# Patient Record
Sex: FEMALE | Race: WHITE | Employment: OTHER | URBAN - METROPOLITAN AREA
[De-identification: names, ages, dates, MRNs, and addresses within clinical notes are randomized per-mention and may not be internally consistent; named-entity substitution may affect disease eponyms.]

---

## 2018-04-06 ENCOUNTER — HOSPITAL ENCOUNTER (OUTPATIENT)
Age: 58
Discharge: HOME | End: 2018-04-08

## 2018-04-06 PROCEDURE — 88305 TISSUE EXAM BY PATHOLOGIST: CPT

## 2018-04-06 PROCEDURE — 88304 TISSUE EXAM BY PATHOLOGIST: CPT

## 2018-06-06 ENCOUNTER — HOSPITAL ENCOUNTER (OUTPATIENT)
Age: 58
Discharge: HOME | End: 2018-06-08

## 2018-06-06 PROCEDURE — 88305 TISSUE EXAM BY PATHOLOGIST: CPT

## 2018-12-07 LAB
CHOLESTEROL, TOTAL: 270 MG/DL
CHOLESTEROL/HDL RATIO: 6.4
HDLC SERPL-MCNC: 42 MG/DL (ref 35–70)
LDL CHOLESTEROL CALCULATED: 186 MG/DL (ref 0–160)
TRIGL SERPL-MCNC: 225 MG/DL
VLDLC SERPL CALC-MCNC: ABNORMAL MG/DL

## 2019-03-19 ENCOUNTER — APPOINTMENT (OUTPATIENT)
Dept: ULTRASOUND IMAGING | Age: 59
End: 2019-03-19
Payer: COMMERCIAL

## 2019-03-19 ENCOUNTER — HOSPITAL ENCOUNTER (EMERGENCY)
Age: 59
Discharge: HOME | End: 2019-03-19
Attending: EMERGENCY MEDICINE
Payer: COMMERCIAL

## 2019-03-19 VITALS
HEART RATE: 80 BPM | DIASTOLIC BLOOD PRESSURE: 68 MMHG | WEIGHT: 164 LBS | BODY MASS INDEX: 24.86 KG/M2 | HEIGHT: 68 IN | TEMPERATURE: 98.8 F | OXYGEN SATURATION: 99 % | RESPIRATION RATE: 16 BRPM | SYSTOLIC BLOOD PRESSURE: 134 MMHG

## 2019-03-19 DIAGNOSIS — M79.89 SWELLING OF LOWER EXTREMITY: Primary | ICD-10-CM

## 2019-03-19 PROCEDURE — 93971 EXTREMITY STUDY: CPT

## 2019-03-19 PROCEDURE — 96372 THER/PROPH/DIAG INJ SC/IM: CPT

## 2019-03-19 PROCEDURE — 6360000002 HC RX W HCPCS: Performed by: STUDENT IN AN ORGANIZED HEALTH CARE EDUCATION/TRAINING PROGRAM

## 2019-03-19 PROCEDURE — 99283 EMERGENCY DEPT VISIT LOW MDM: CPT

## 2019-03-19 PROCEDURE — 94761 N-INVAS EAR/PLS OXIMETRY MLT: CPT

## 2019-03-19 RX ORDER — KETOROLAC TROMETHAMINE 30 MG/ML
15 INJECTION, SOLUTION INTRAMUSCULAR; INTRAVENOUS ONCE
Status: COMPLETED | OUTPATIENT
Start: 2019-03-19 | End: 2019-03-19

## 2019-03-19 RX ADMIN — KETOROLAC TROMETHAMINE 15 MG: 30 INJECTION, SOLUTION INTRAMUSCULAR; INTRAVENOUS at 18:55

## 2019-03-19 SDOH — HEALTH STABILITY: MENTAL HEALTH: HOW OFTEN DO YOU HAVE A DRINK CONTAINING ALCOHOL?: NEVER

## 2019-03-19 ASSESSMENT — PAIN SCALES - GENERAL: PAINLEVEL_OUTOF10: 3

## 2019-03-20 ASSESSMENT — ENCOUNTER SYMPTOMS
COUGH: 0
EYE PAIN: 0
WHEEZING: 0
ABDOMINAL DISTENTION: 0
SINUS PRESSURE: 0
BACK PAIN: 0
NAUSEA: 0
ABDOMINAL PAIN: 0
EYE REDNESS: 0
CHEST TIGHTNESS: 0
TROUBLE SWALLOWING: 0
PHOTOPHOBIA: 0
CONSTIPATION: 0
BLOOD IN STOOL: 0
RHINORRHEA: 0
VOMITING: 0
DIARRHEA: 0
SORE THROAT: 0
SHORTNESS OF BREATH: 0

## 2019-03-21 ENCOUNTER — HOSPITAL ENCOUNTER (OUTPATIENT)
Dept: MRI IMAGING | Age: 59
Discharge: HOME | End: 2019-03-23
Payer: COMMERCIAL

## 2019-03-21 DIAGNOSIS — S94.31XA: ICD-10-CM

## 2019-03-21 DIAGNOSIS — M21.371 FOOT DROP, RIGHT FOOT: ICD-10-CM

## 2019-03-21 PROCEDURE — 73721 MRI JNT OF LWR EXTRE W/O DYE: CPT

## 2019-03-21 PROCEDURE — 72148 MRI LUMBAR SPINE W/O DYE: CPT

## 2019-04-30 ENCOUNTER — HOSPITAL ENCOUNTER (OUTPATIENT)
Age: 59
Discharge: HOME | End: 2019-05-02

## 2019-04-30 LAB
ABO/RH: NORMAL
ANION GAP SERPL CALCULATED.3IONS-SCNC: 10 MMOL/L (ref 7–16)
ANTIBODY SCREEN: NORMAL
APTT: 26.3 SEC (ref 24.5–35.1)
BASOPHILS ABSOLUTE: 0.04 E9/L (ref 0–0.2)
BASOPHILS RELATIVE PERCENT: 0.6 % (ref 0–2)
BUN BLDV-MCNC: 18 MG/DL (ref 6–20)
CALCIUM SERPL-MCNC: 9.5 MG/DL (ref 8.6–10.2)
CHLORIDE BLD-SCNC: 101 MMOL/L (ref 98–107)
CO2: 28 MMOL/L (ref 22–29)
CREAT SERPL-MCNC: 0.6 MG/DL (ref 0.5–1)
EOSINOPHILS ABSOLUTE: 0.09 E9/L (ref 0.05–0.5)
EOSINOPHILS RELATIVE PERCENT: 1.2 % (ref 0–6)
GFR AFRICAN AMERICAN: >60
GFR NON-AFRICAN AMERICAN: >60 ML/MIN/1.73
GLUCOSE BLD-MCNC: 100 MG/DL (ref 74–99)
HCT VFR BLD CALC: 36.4 % (ref 34–48)
HEMOGLOBIN: 11.6 G/DL (ref 11.5–15.5)
IMMATURE GRANULOCYTES #: 0.03 E9/L
IMMATURE GRANULOCYTES %: 0.4 % (ref 0–5)
INR BLD: 0.9
LYMPHOCYTES ABSOLUTE: 1.42 E9/L (ref 1.5–4)
LYMPHOCYTES RELATIVE PERCENT: 19.7 % (ref 20–42)
MCH RBC QN AUTO: 28.6 PG (ref 26–35)
MCHC RBC AUTO-ENTMCNC: 31.9 % (ref 32–34.5)
MCV RBC AUTO: 89.9 FL (ref 80–99.9)
MONOCYTES ABSOLUTE: 0.42 E9/L (ref 0.1–0.95)
MONOCYTES RELATIVE PERCENT: 5.8 % (ref 2–12)
NEUTROPHILS ABSOLUTE: 5.21 E9/L (ref 1.8–7.3)
NEUTROPHILS RELATIVE PERCENT: 72.3 % (ref 43–80)
PDW BLD-RTO: 13.6 FL (ref 11.5–15)
PLATELET # BLD: 227 E9/L (ref 130–450)
PMV BLD AUTO: 10.8 FL (ref 7–12)
POTASSIUM SERPL-SCNC: 4.6 MMOL/L (ref 3.5–5)
PROTHROMBIN TIME: 10 SEC (ref 9.3–12.4)
RBC # BLD: 4.05 E12/L (ref 3.5–5.5)
SODIUM BLD-SCNC: 139 MMOL/L (ref 132–146)
WBC # BLD: 7.2 E9/L (ref 4.5–11.5)

## 2019-04-30 PROCEDURE — 85025 COMPLETE CBC W/AUTO DIFF WBC: CPT

## 2019-04-30 PROCEDURE — 86850 RBC ANTIBODY SCREEN: CPT

## 2019-04-30 PROCEDURE — 80048 BASIC METABOLIC PNL TOTAL CA: CPT

## 2019-04-30 PROCEDURE — 85730 THROMBOPLASTIN TIME PARTIAL: CPT

## 2019-04-30 PROCEDURE — 87081 CULTURE SCREEN ONLY: CPT

## 2019-04-30 PROCEDURE — 86900 BLOOD TYPING SEROLOGIC ABO: CPT

## 2019-04-30 PROCEDURE — 86901 BLOOD TYPING SEROLOGIC RH(D): CPT

## 2019-04-30 PROCEDURE — 85610 PROTHROMBIN TIME: CPT

## 2019-05-02 LAB — MRSA CULTURE ONLY: NORMAL

## 2019-05-08 ENCOUNTER — HOSPITAL ENCOUNTER (OUTPATIENT)
Age: 59
Discharge: HOME | End: 2019-05-10

## 2019-05-08 LAB
ANION GAP SERPL CALCULATED.3IONS-SCNC: 10 MMOL/L (ref 7–16)
BUN BLDV-MCNC: 12 MG/DL (ref 6–20)
CALCIUM SERPL-MCNC: 9.2 MG/DL (ref 8.6–10.2)
CHLORIDE BLD-SCNC: 103 MMOL/L (ref 98–107)
CO2: 26 MMOL/L (ref 22–29)
CREAT SERPL-MCNC: 0.6 MG/DL (ref 0.5–1)
GFR AFRICAN AMERICAN: >60
GFR NON-AFRICAN AMERICAN: >60 ML/MIN/1.73
GLUCOSE BLD-MCNC: 104 MG/DL (ref 74–99)
HCT VFR BLD CALC: 33.1 % (ref 34–48)
HEMOGLOBIN: 10.5 G/DL (ref 11.5–15.5)
MCH RBC QN AUTO: 28.3 PG (ref 26–35)
MCHC RBC AUTO-ENTMCNC: 31.7 % (ref 32–34.5)
MCV RBC AUTO: 89.2 FL (ref 80–99.9)
PDW BLD-RTO: 13.7 FL (ref 11.5–15)
PLATELET # BLD: 198 E9/L (ref 130–450)
PMV BLD AUTO: 11.6 FL (ref 7–12)
POTASSIUM SERPL-SCNC: 4.1 MMOL/L (ref 3.5–5)
RBC # BLD: 3.71 E12/L (ref 3.5–5.5)
SODIUM BLD-SCNC: 139 MMOL/L (ref 132–146)
WBC # BLD: 13.6 E9/L (ref 4.5–11.5)

## 2019-05-08 PROCEDURE — 80048 BASIC METABOLIC PNL TOTAL CA: CPT

## 2019-05-08 PROCEDURE — 85027 COMPLETE CBC AUTOMATED: CPT

## 2019-05-09 ENCOUNTER — HOSPITAL ENCOUNTER (OUTPATIENT)
Age: 59
Discharge: HOME | End: 2019-05-11

## 2019-05-09 DIAGNOSIS — G47.00 INSOMNIA, UNSPECIFIED TYPE: Primary | ICD-10-CM

## 2019-05-09 LAB
ANION GAP SERPL CALCULATED.3IONS-SCNC: 10 MMOL/L (ref 7–16)
BUN BLDV-MCNC: 12 MG/DL (ref 6–20)
CALCIUM SERPL-MCNC: 8.4 MG/DL (ref 8.6–10.2)
CHLORIDE BLD-SCNC: 105 MMOL/L (ref 98–107)
CO2: 27 MMOL/L (ref 22–29)
CREAT SERPL-MCNC: 0.7 MG/DL (ref 0.5–1)
GFR AFRICAN AMERICAN: >60
GFR NON-AFRICAN AMERICAN: >60 ML/MIN/1.73
GLUCOSE BLD-MCNC: 89 MG/DL (ref 74–99)
HCT VFR BLD CALC: 31.8 % (ref 34–48)
HEMOGLOBIN: 9.8 G/DL (ref 11.5–15.5)
MCH RBC QN AUTO: 28.5 PG (ref 26–35)
MCHC RBC AUTO-ENTMCNC: 30.8 % (ref 32–34.5)
MCV RBC AUTO: 92.4 FL (ref 80–99.9)
PDW BLD-RTO: 14.1 FL (ref 11.5–15)
PLATELET # BLD: 166 E9/L (ref 130–450)
PMV BLD AUTO: 11.4 FL (ref 7–12)
POTASSIUM SERPL-SCNC: 4.1 MMOL/L (ref 3.5–5)
RBC # BLD: 3.44 E12/L (ref 3.5–5.5)
SODIUM BLD-SCNC: 142 MMOL/L (ref 132–146)
WBC # BLD: 9.1 E9/L (ref 4.5–11.5)

## 2019-05-09 PROCEDURE — 80048 BASIC METABOLIC PNL TOTAL CA: CPT

## 2019-05-09 PROCEDURE — 85027 COMPLETE CBC AUTOMATED: CPT

## 2019-05-09 RX ORDER — ZOLPIDEM TARTRATE 12.5 MG/1
12.5 TABLET, FILM COATED, EXTENDED RELEASE ORAL NIGHTLY PRN
Qty: 30 TABLET | Refills: 1 | Status: CANCELLED | OUTPATIENT
Start: 2019-05-09 | End: 2019-07-08

## 2019-05-09 RX ORDER — ZOLPIDEM TARTRATE 12.5 MG/1
12.5 TABLET, FILM COATED, EXTENDED RELEASE ORAL NIGHTLY PRN
Qty: 30 TABLET | Refills: 1 | OUTPATIENT
Start: 2019-05-09 | End: 2019-07-08

## 2019-05-10 ENCOUNTER — HOSPITAL ENCOUNTER (OUTPATIENT)
Age: 59
Discharge: HOME | End: 2019-05-12

## 2019-05-10 LAB
ANION GAP SERPL CALCULATED.3IONS-SCNC: 10 MMOL/L (ref 7–16)
BUN BLDV-MCNC: 12 MG/DL (ref 6–20)
CALCIUM SERPL-MCNC: 9.1 MG/DL (ref 8.6–10.2)
CHLORIDE BLD-SCNC: 99 MMOL/L (ref 98–107)
CO2: 29 MMOL/L (ref 22–29)
CREAT SERPL-MCNC: 0.7 MG/DL (ref 0.5–1)
GFR AFRICAN AMERICAN: >60
GFR NON-AFRICAN AMERICAN: >60 ML/MIN/1.73
GLUCOSE BLD-MCNC: 91 MG/DL (ref 74–99)
HCT VFR BLD CALC: 34.2 % (ref 34–48)
HEMOGLOBIN: 10.4 G/DL (ref 11.5–15.5)
MCH RBC QN AUTO: 27.7 PG (ref 26–35)
MCHC RBC AUTO-ENTMCNC: 30.4 % (ref 32–34.5)
MCV RBC AUTO: 91 FL (ref 80–99.9)
PDW BLD-RTO: 13.8 FL (ref 11.5–15)
PLATELET # BLD: 172 E9/L (ref 130–450)
PMV BLD AUTO: 11.5 FL (ref 7–12)
POTASSIUM SERPL-SCNC: 4.3 MMOL/L (ref 3.5–5)
RBC # BLD: 3.76 E12/L (ref 3.5–5.5)
SODIUM BLD-SCNC: 138 MMOL/L (ref 132–146)
WBC # BLD: 9.2 E9/L (ref 4.5–11.5)

## 2019-05-10 PROCEDURE — 80048 BASIC METABOLIC PNL TOTAL CA: CPT

## 2019-05-10 PROCEDURE — 85027 COMPLETE CBC AUTOMATED: CPT

## 2019-05-24 RX ORDER — OXYCODONE AND ACETAMINOPHEN 10; 325 MG/1; MG/1
1 TABLET ORAL EVERY 4 HOURS PRN
OUTPATIENT
Start: 2019-05-24

## 2019-06-04 ENCOUNTER — OFFICE VISIT (OUTPATIENT)
Dept: FAMILY MEDICINE CLINIC | Age: 59
End: 2019-06-04
Payer: COMMERCIAL

## 2019-06-04 VITALS
SYSTOLIC BLOOD PRESSURE: 130 MMHG | HEART RATE: 68 BPM | BODY MASS INDEX: 25.74 KG/M2 | DIASTOLIC BLOOD PRESSURE: 78 MMHG | TEMPERATURE: 97 F | WEIGHT: 164 LBS | HEIGHT: 67 IN | OXYGEN SATURATION: 97 %

## 2019-06-04 DIAGNOSIS — M62.830 SPASM OF LUMBAR PARASPINOUS MUSCLE: ICD-10-CM

## 2019-06-04 DIAGNOSIS — M48.062 LUMBAR STENOSIS WITH NEUROGENIC CLAUDICATION: Primary | ICD-10-CM

## 2019-06-04 DIAGNOSIS — H10.10 ALLERGIC CONJUNCTIVITIS AND RHINITIS, UNSPECIFIED LATERALITY: ICD-10-CM

## 2019-06-04 DIAGNOSIS — J30.9 ALLERGIC CONJUNCTIVITIS AND RHINITIS, UNSPECIFIED LATERALITY: ICD-10-CM

## 2019-06-04 DIAGNOSIS — M21.371 RIGHT FOOT DROP: ICD-10-CM

## 2019-06-04 PROCEDURE — 99214 OFFICE O/P EST MOD 30 MIN: CPT | Performed by: INTERNAL MEDICINE

## 2019-06-04 RX ORDER — CYCLOBENZAPRINE HCL 10 MG
10 TABLET ORAL NIGHTLY
Qty: 30 TABLET | Refills: 0 | Status: SHIPPED | OUTPATIENT
Start: 2019-06-04 | End: 2019-07-04

## 2019-06-04 RX ORDER — FLUTICASONE PROPIONATE 50 MCG
1 SPRAY, SUSPENSION (ML) NASAL DAILY
Qty: 1 BOTTLE | Refills: 2 | Status: SHIPPED | OUTPATIENT
Start: 2019-06-04

## 2019-06-04 RX ORDER — PREGABALIN 50 MG/1
50 CAPSULE ORAL 4 TIMES DAILY
Qty: 90 CAPSULE | Refills: 2 | Status: SHIPPED | OUTPATIENT
Start: 2019-06-04 | End: 2019-06-04 | Stop reason: SDUPTHER

## 2019-06-04 RX ORDER — PREGABALIN 50 MG/1
50 CAPSULE ORAL 4 TIMES DAILY
Qty: 120 CAPSULE | Refills: 2 | Status: SHIPPED | OUTPATIENT
Start: 2019-06-04 | End: 2019-08-15 | Stop reason: SDUPTHER

## 2019-06-04 RX ORDER — OXYCODONE AND ACETAMINOPHEN 10; 325 MG/1; MG/1
1 TABLET ORAL EVERY 6 HOURS PRN
Qty: 90 TABLET | Refills: 0 | Status: SHIPPED | OUTPATIENT
Start: 2019-06-18 | End: 2019-07-18

## 2019-06-04 NOTE — PROGRESS NOTES
2019     Radha Walter (:  1960) is a 66 y.o. female, here for evaluation of the following medical concerns:    OARRS: 19  Pain management agreement: 18  PHQ-9: 18  Urine drug screen: 18  Pain assessment questionnaire:  She does not use alcohol or tobacco. She is recently had lumbar discectomy performed by neurosurgery. She had an L3- 4- 5 fusion performed on May 7, 2019. Her pain at its worse is an 8/10    She actually did very well unexpectedly to the surgery. We did not expect much improvement in the right foot drop. However she is regaining what she thinks is 50% of her strength. Neurosurgery is very happy with this as are we today. She is having spasms in her back that does keep her awake at night. I'm noticing that she appears very tired and washed out today. A few episodes of sneezing has been causing some back spasms and pain. Review of Systems    Prior to Visit Medications    Medication Sig Taking? Authorizing Provider   cyclobenzaprine (FLEXERIL) 10 MG tablet Take 1 tablet by mouth nightly Yes Ildefonso Hernandez DO   oxyCODONE-acetaminophen (PERCOCET)  MG per tablet Take 1 tablet by mouth every 6 hours as needed for Pain for up to 30 days. Yes Ildefonso Hernandez DO   pregabalin (LYRICA) 50 MG capsule Take 1 capsule by mouth 4 times daily for 30 days. Yes Ildefonso Hernandez, DO   fluticasone [facility]) 50 MCG/ACT nasal spray 1 spray by Each Nostril route daily Yes Ildefonso Hernandez DO   zolpidem (AMBIEN CR) 12.5 MG extended release tablet Take 1 tablet by mouth nightly as needed for Sleep for up to 60 days.  Yes Ildefonso Hernandez DO   ranitidine (ZANTAC) 150 MG tablet Take 150 mg by mouth 2 times daily Yes Historical Provider, MD   tiZANidine (ZANAFLEX) 2 MG capsule Take 2 mg by mouth daily as needed for Muscle spasms Yes Historical Provider, MD   levothyroxine (SYNTHROID) 50 MCG tablet Take 50 mcg by mouth Daily Yes Historical Provider, MD

## 2019-06-07 ENCOUNTER — TELEPHONE (OUTPATIENT)
Dept: FAMILY MEDICINE CLINIC | Age: 59
End: 2019-06-07

## 2019-06-07 NOTE — TELEPHONE ENCOUNTER
With her heart she's not to use any other OTC decongestants. Typically Sudafed 10 mg dose(children's) is safe. Because of the recent surgery we cannot use steroids again.

## 2019-06-07 NOTE — TELEPHONE ENCOUNTER
Pt lmom she was seen tujose luis and you ordered a nasal spray for her sinus congestion. Now she has a headache and ear ache. She just had surgery , she is unable to drive here. Are you willing to recommend anything ?

## 2019-07-09 DIAGNOSIS — G47.00 INSOMNIA, UNSPECIFIED TYPE: Primary | ICD-10-CM

## 2019-07-09 RX ORDER — ZOLPIDEM TARTRATE 12.5 MG/1
1 TABLET, FILM COATED, EXTENDED RELEASE ORAL NIGHTLY
COMMUNITY
End: 2019-07-09 | Stop reason: SDUPTHER

## 2019-07-09 RX ORDER — ZOLPIDEM TARTRATE 12.5 MG/1
12.5 TABLET, FILM COATED, EXTENDED RELEASE ORAL NIGHTLY
Qty: 30 TABLET | Refills: 0 | Status: SHIPPED | OUTPATIENT
Start: 2019-07-09 | End: 2019-08-08

## 2019-07-09 NOTE — TELEPHONE ENCOUNTER
She is calling for a new script for Ambien. She has an appt on the 19th, but is going to run out of her pain med on Fri. She said that you usually give her 120, but last time you gave her 80.

## 2019-07-17 RX ORDER — NARATRIPTAN 1 MG/1
1 TABLET ORAL DAILY PRN
COMMUNITY
Start: 2018-08-16 | End: 2019-08-07 | Stop reason: SDUPTHER

## 2019-07-17 RX ORDER — ORPHENADRINE CITRATE 100 MG/1
100 TABLET, EXTENDED RELEASE ORAL 2 TIMES DAILY
COMMUNITY
End: 2019-09-06 | Stop reason: ALTCHOICE

## 2019-07-17 RX ORDER — PREGABALIN 50 MG/1
50 CAPSULE ORAL 4 TIMES DAILY
COMMUNITY
End: 2020-02-04

## 2019-07-17 RX ORDER — ROSUVASTATIN CALCIUM 40 MG/1
40 TABLET, COATED ORAL DAILY
COMMUNITY
Start: 2018-12-10 | End: 2019-11-12 | Stop reason: SDUPTHER

## 2019-07-17 RX ORDER — B-COMPLEX WITH VITAMIN C
TABLET ORAL 2 TIMES DAILY
COMMUNITY

## 2019-07-17 RX ORDER — MELOXICAM 15 MG/1
15 TABLET ORAL DAILY
COMMUNITY
Start: 2016-09-09 | End: 2019-11-12 | Stop reason: SDUPTHER

## 2019-07-17 RX ORDER — MOMETASONE FUROATE 50 UG/1
2 SPRAY, METERED NASAL DAILY
COMMUNITY
Start: 2018-07-20 | End: 2020-02-04

## 2019-07-19 ENCOUNTER — OFFICE VISIT (OUTPATIENT)
Dept: FAMILY MEDICINE CLINIC | Age: 59
End: 2019-07-19
Payer: COMMERCIAL

## 2019-07-19 VITALS
DIASTOLIC BLOOD PRESSURE: 80 MMHG | OXYGEN SATURATION: 98 % | SYSTOLIC BLOOD PRESSURE: 132 MMHG | BODY MASS INDEX: 26.98 KG/M2 | HEIGHT: 68 IN | HEART RATE: 86 BPM | WEIGHT: 178 LBS

## 2019-07-19 DIAGNOSIS — M25.552 LEFT HIP PAIN: ICD-10-CM

## 2019-07-19 DIAGNOSIS — M05.9 RHEUMATOID ARTHRITIS WITH POSITIVE RHEUMATOID FACTOR, INVOLVING UNSPECIFIED SITE (HCC): ICD-10-CM

## 2019-07-19 DIAGNOSIS — J30.9 ALLERGIC RHINITIS, UNSPECIFIED SEASONALITY, UNSPECIFIED TRIGGER: ICD-10-CM

## 2019-07-19 DIAGNOSIS — M48.062 LUMBAR STENOSIS WITH NEUROGENIC CLAUDICATION: Primary | ICD-10-CM

## 2019-07-19 PROCEDURE — 99213 OFFICE O/P EST LOW 20 MIN: CPT | Performed by: INTERNAL MEDICINE

## 2019-07-19 RX ORDER — OXYCODONE AND ACETAMINOPHEN 10; 325 MG/1; MG/1
1 TABLET ORAL EVERY 6 HOURS
COMMUNITY
End: 2019-07-19 | Stop reason: SDUPTHER

## 2019-07-19 RX ORDER — OXYCODONE AND ACETAMINOPHEN 10; 325 MG/1; MG/1
1 TABLET ORAL EVERY 6 HOURS
Qty: 120 TABLET | Refills: 0 | Status: SHIPPED | OUTPATIENT
Start: 2019-07-19 | End: 2019-08-15 | Stop reason: SDUPTHER

## 2019-07-19 RX ORDER — CYCLOBENZAPRINE HCL 10 MG
10 TABLET ORAL 3 TIMES DAILY PRN
COMMUNITY
End: 2019-07-19 | Stop reason: SDUPTHER

## 2019-07-19 RX ORDER — CYCLOBENZAPRINE HCL 10 MG
10 TABLET ORAL 3 TIMES DAILY PRN
Qty: 90 TABLET | Refills: 3 | Status: SHIPPED | OUTPATIENT
Start: 2019-07-19 | End: 2019-08-18

## 2019-07-19 NOTE — PROGRESS NOTES
Provider, MD   mometasone (NASONEX) 50 MCG/ACT nasal spray 2 sprays by Each Nostril route daily Yes Historical Provider, MD   meloxicam (MOBIC) 15 MG tablet Take 15 mg by mouth daily Yes Historical Provider, MD   aspirin 81 MG tablet Take 81 mg by mouth daily Yes Historical Provider, MD   B Complex Vitamins (VITAMIN B COMPLEX) TABS Take by mouth 2 times daily Indications: 1 po bif (low B12 on 5/2018 labs) Yes Historical Provider, MD   orphenadrine (NORFLEX) 100 MG extended release tablet Take 100 mg by mouth 2 times daily Yes Historical Provider, MD   pregabalin (LYRICA) 50 MG capsule Take 50 mg by mouth 4 times daily. Yes Historical Provider, MD   zolpidem (AMBIEN CR) 12.5 MG extended release tablet Take 1 tablet by mouth nightly for 30 days. Yes Ramandeep Marks DO   fluticasone [facility]) 50 MCG/ACT nasal spray 1 spray by Each Nostril route daily Yes Ramandeep Marks DO   ranitidine (ZANTAC) 150 MG tablet Take 150 mg by mouth 2 times daily Yes Historical Provider, MD   tiZANidine (ZANAFLEX) 2 MG capsule Take 2 mg by mouth daily as needed for Muscle spasms Yes Historical Provider, MD   levothyroxine (SYNTHROID) 50 MCG tablet Take 50 mcg by mouth Daily Yes Historical Provider, MD   nabumetone (RELAFEN) 750 MG tablet Take 750 mg by mouth 2 times daily Yes Historical Provider, MD   Cholecalciferol (VITAMIN D) 2000 UNITS CAPS capsule Take 2,000 Units by mouth daily Yes Historical Provider, MD   SUMAtriptan (IMITREX) 50 MG tablet Take 50 mg by mouth once as needed for Migraine Yes Historical Provider, MD   Diltiazem HCl Coated Beads (DILTIAZEM CD PO) Take by mouth daily Yes Historical Provider, MD   pregabalin (LYRICA) 50 MG capsule Take 1 capsule by mouth 4 times daily for 30 days.   Ramandeep Marks DO        Social History     Tobacco Use    Smoking status: Never Smoker    Smokeless tobacco: Never Used   Substance Use Topics    Alcohol use: Never     Frequency: Never        Vitals:    07/19/19 1315 BP: 132/80   Pulse: 86   SpO2: 98%   Weight: 178 lb (80.7 kg)   Height: 5' 8\" (1.727 m)     Estimated body mass index is 27.06 kg/m² as calculated from the following:    Height as of this encounter: 5' 8\" (1.727 m). Weight as of this encounter: 178 lb (80.7 kg). Physical Exam   Heart is regular rate and rhythm without gallops or clicks in the lungs are clear to auscultation without wheezes rales or rhonchi. Sinus mucosa shows pallor and edema with clear and clear drainage. No evidence of any injection. There is no palpable maxillary edema noted. Posterior pharynx was clear drainage. TMs are bilaterally intact without serous otitis. Eyes are PERRL and EOMI sclerae white conjunctivae clear. She ambulates really without any difficulty today. Skin warm and dry. She is able to go from a seated to standing position and does not require any assistance and can do it under 30 seconds without any significant difficulty. DTRs are intact of the lower extremities and are not hyperreflexic. ASSESSMENT/PLAN:    ICD-10-CM    1. Lumbar stenosis with neurogenic claudication M48.062 oxyCODONE-acetaminophen (PERCOCET)  MG per tablet        No follow-ups on file. Patient requests narcotic medication refill. I have reviewed the current medications and prior notes regarding the need for these refills  I believe the need for refill is warranted at this time. I have reviewed the OARRS report and have found no suspicion of drug seeking behavior. I have discussed the side effects and narcotic policy with this patient ad the patient has demonstrated understanding. Following the guidelines of the CDC, [place] and state medical board of [place], a refill of the narcotic was given today  A follow up appointment will be scheduled with me. Have refilled her controlled substance Lyrica also. She is having spasms that keeps her awake at night. I've given her Flexeril 10 mg to use just at night.       --David Shukla, DO on 7/19/2019 at 1:44 PM

## 2019-07-23 ENCOUNTER — TELEPHONE (OUTPATIENT)
Dept: FAMILY MEDICINE CLINIC | Age: 59
End: 2019-07-23

## 2019-07-23 RX ORDER — AMOXICILLIN 875 MG/1
875 TABLET, COATED ORAL 2 TIMES DAILY
Qty: 20 TABLET | Refills: 0 | Status: SHIPPED | OUTPATIENT
Start: 2019-07-23 | End: 2019-08-02

## 2019-07-23 NOTE — TELEPHONE ENCOUNTER
Patient called in stating that she is feeling worse than she did on Friday. States she thinks its a sinus infection.

## 2019-08-07 RX ORDER — NARATRIPTAN 1 MG/1
1 TABLET ORAL ONCE
Qty: 90 TABLET | Refills: 1 | Status: SHIPPED | OUTPATIENT
Start: 2019-08-07 | End: 2019-08-15 | Stop reason: SDUPTHER

## 2019-08-09 DIAGNOSIS — G47.00 INSOMNIA, UNSPECIFIED TYPE: Primary | ICD-10-CM

## 2019-08-09 RX ORDER — ZOLPIDEM TARTRATE 12.5 MG/1
12.5 TABLET, FILM COATED, EXTENDED RELEASE ORAL NIGHTLY PRN
Qty: 30 TABLET | Refills: 0 | Status: SHIPPED | OUTPATIENT
Start: 2019-08-09 | End: 2019-08-15 | Stop reason: SDUPTHER

## 2019-08-09 RX ORDER — ZOLPIDEM TARTRATE 12.5 MG/1
12.5 TABLET, FILM COATED, EXTENDED RELEASE ORAL NIGHTLY PRN
COMMUNITY
End: 2019-08-09 | Stop reason: SDUPTHER

## 2019-08-15 ENCOUNTER — OFFICE VISIT (OUTPATIENT)
Dept: FAMILY MEDICINE CLINIC | Age: 59
End: 2019-08-15
Payer: COMMERCIAL

## 2019-08-15 VITALS
SYSTOLIC BLOOD PRESSURE: 134 MMHG | DIASTOLIC BLOOD PRESSURE: 74 MMHG | HEIGHT: 68 IN | HEART RATE: 87 BPM | OXYGEN SATURATION: 97 % | BODY MASS INDEX: 27.06 KG/M2 | TEMPERATURE: 97 F

## 2019-08-15 DIAGNOSIS — M48.062 LUMBAR STENOSIS WITH NEUROGENIC CLAUDICATION: ICD-10-CM

## 2019-08-15 DIAGNOSIS — G47.00 INSOMNIA, UNSPECIFIED TYPE: ICD-10-CM

## 2019-08-15 DIAGNOSIS — M79.672 FOOT PAIN, LEFT: Primary | ICD-10-CM

## 2019-08-15 PROBLEM — S92.423A: Status: ACTIVE | Noted: 2019-08-15

## 2019-08-15 PROCEDURE — 99214 OFFICE O/P EST MOD 30 MIN: CPT | Performed by: INTERNAL MEDICINE

## 2019-08-15 RX ORDER — OXYCODONE AND ACETAMINOPHEN 10; 325 MG/1; MG/1
1 TABLET ORAL EVERY 6 HOURS
Qty: 120 TABLET | Refills: 0 | Status: SHIPPED | OUTPATIENT
Start: 2019-08-15 | End: 2019-09-10 | Stop reason: SDUPTHER

## 2019-08-15 RX ORDER — NEOMYCIN SULFATE, POLYMYXIN B SULFATE, AND DEXAMETHASONE 3.5; 10000; 1 MG/G; [USP'U]/G; MG/G
OINTMENT OPHTHALMIC 3 TIMES DAILY
Qty: 3.5 G | Refills: 2 | Status: SHIPPED | OUTPATIENT
Start: 2019-08-15 | End: 2020-02-04

## 2019-08-15 RX ORDER — ZOLPIDEM TARTRATE 12.5 MG/1
12.5 TABLET, FILM COATED, EXTENDED RELEASE ORAL NIGHTLY PRN
Qty: 30 TABLET | Refills: 0 | Status: SHIPPED | OUTPATIENT
Start: 2019-08-15 | End: 2019-09-14

## 2019-08-15 RX ORDER — NARATRIPTAN 1 MG/1
1 TABLET ORAL ONCE
Qty: 9 TABLET | Refills: 2 | Status: SHIPPED | OUTPATIENT
Start: 2019-08-15 | End: 2019-12-09

## 2019-08-15 RX ORDER — PREGABALIN 50 MG/1
50 CAPSULE ORAL 4 TIMES DAILY
Qty: 120 CAPSULE | Refills: 2 | Status: SHIPPED | OUTPATIENT
Start: 2019-08-15 | End: 2019-09-06 | Stop reason: SDUPTHER

## 2019-08-15 NOTE — PROGRESS NOTES
8/15/2019     Kerri Antunez (:  1960) is a 66 y.o. female, here for evaluation of the following medical concerns:  She is feeling a grinding sensation again in the left hip. She is already been to see orthopedics once about the knee. I told her she should follow-up with orthopedics again as this is already had a hip arthroplasty performed. He is also having some green discharge from her sinuses in the last 2 days. Headaches and congestion for the last week. No fevers or chills. OARRS: 8-15-19  Pain management agreement: 18  PHQ-9: 18  Urine drug screen: 18  Pain assessment questionnaire:  She does not use alcohol or tobacco. She is recently had lumbar discectomy performed by neurosurgery. She had an L3- 4- 5 fusion performed on May 7, 2019. Her pain at its worse is an 8/10    She also had tripped pinning her left great toe behind her foot 2 weeks ago. The swelling has gone down but is still rather uncomfortable for her. She is requesting a refill of her ophthalmologic ointment that she uses for any kind of styes that develop. Review of Systems    Prior to Visit Medications    Medication Sig Taking? Authorizing Provider   oxyCODONE-acetaminophen (PERCOCET)  MG per tablet Take 1 tablet by mouth every 6 hours for 30 days. Yes Ping Blanca, DO   zolpidem (AMBIEN CR) 12.5 MG extended release tablet Take 1 tablet by mouth nightly as needed for Sleep for up to 30 days. Yes Ping Blanca, DO   naratriptan HCl (AMERGE) 1 MG tablet Take 1 tablet by mouth once for 1 dose 1 po q 12 prn migraine Yes Ping Blanca, DO   pregabalin (LYRICA) 50 MG capsule Take 1 capsule by mouth 4 times daily for 30 days.  Yes Ping Blanca, DO   neomycin-polymyxin-dexameth 3.5-89757-8.1 OINT Place into both eyes 3 times daily Yes Ping Blanca, DO   cyclobenzaprine (FLEXERIL) 10 MG tablet Take 1 tablet by mouth 3 times daily as needed for Muscle spasms  Madeleine Gonzalez m)     Estimated body mass index is 27.06 kg/m² as calculated from the following:    Height as of this encounter: 5' 8\" (1.727 m). Weight as of 7/19/19: 178 lb (80.7 kg). Physical Exam   Heart is regular rate and rhythm without gallops or clicks in the lungs are clear to auscultation without wheezes rales or rhonchi. Sinus mucosa shows pallor and edema with clear and clear drainage. No evidence of any injection. There is no palpable maxillary edema noted. Posterior pharynx was clear drainage. TMs are bilaterally intact without serous otitis. Eyes are PERRL and EOMI sclerae white conjunctivae clear. She ambulates really without any difficulty today. Skin warm and dry. She is able to go from a seated to standing position and does not require any assistance and can do it under 30 seconds without any significant difficulty. Exam of the left great toe does show to begin tenderness. Unable to manipulate this at all. An x-ray was performed today and was reviewed with the patient. This shows a slightly displaced non-union fracture possibly spiral fracture of the left great toe. ASSESSMENT/PLAN:    ICD-10-CM    1. Lumbar stenosis with neurogenic claudication M48.062 oxyCODONE-acetaminophen (PERCOCET)  MG per tablet     pregabalin (LYRICA) 50 MG capsule   2. Insomnia, unspecified type G47.00 zolpidem (AMBIEN CR) 12.5 MG extended release tablet        No follow-ups on file. Patient requests narcotic medication refill. I have reviewed the current medications and prior notes regarding the need for these refills  I believe the need for refill is warranted at this time. I have reviewed the OARRS report and have found no suspicion of drug seeking behavior. I have discussed the side effects and narcotic policy with this patient ad the patient has demonstrated understanding.   Following the guidelines of the CDC, [place] and state medical board of [place], a refill of the narcotic was given today  A follow up

## 2019-08-16 ENCOUNTER — OFFICE VISIT (OUTPATIENT)
Dept: FAMILY MEDICINE CLINIC | Age: 59
End: 2019-08-16
Payer: COMMERCIAL

## 2019-08-16 ENCOUNTER — OFFICE VISIT (OUTPATIENT)
Dept: PODIATRY | Age: 59
End: 2019-08-16
Payer: COMMERCIAL

## 2019-08-16 VITALS
SYSTOLIC BLOOD PRESSURE: 143 MMHG | HEIGHT: 68 IN | DIASTOLIC BLOOD PRESSURE: 91 MMHG | BODY MASS INDEX: 27.06 KG/M2 | TEMPERATURE: 97.7 F

## 2019-08-16 DIAGNOSIS — M79.675 PAIN IN LEFT TOE(S): ICD-10-CM

## 2019-08-16 DIAGNOSIS — M05.9 RHEUMATOID ARTHRITIS WITH POSITIVE RHEUMATOID FACTOR, INVOLVING UNSPECIFIED SITE (HCC): ICD-10-CM

## 2019-08-16 DIAGNOSIS — Z01.818 PRE-OP EXAM: Primary | ICD-10-CM

## 2019-08-16 DIAGNOSIS — S92.422A CLOSED DISPLACED FRACTURE OF DISTAL PHALANX OF LEFT GREAT TOE, INITIAL ENCOUNTER: Primary | ICD-10-CM

## 2019-08-16 PROCEDURE — 99213 OFFICE O/P EST LOW 20 MIN: CPT | Performed by: PODIATRIST

## 2019-08-16 PROCEDURE — 93000 ELECTROCARDIOGRAM COMPLETE: CPT | Performed by: INTERNAL MEDICINE

## 2019-08-16 PROCEDURE — 99212 OFFICE O/P EST SF 10 MIN: CPT | Performed by: INTERNAL MEDICINE

## 2019-08-16 NOTE — PROGRESS NOTES
(NORFLEX) 100 MG extended release tablet Take 100 mg by mouth 2 times daily  Historical Provider, MD   pregabalin (LYRICA) 50 MG capsule Take 50 mg by mouth 4 times daily. Historical Provider, MD   fluticasone (FLONASE) 50 MCG/ACT nasal spray 1 spray by Each Nostril route daily  Manasa Jensen DO   ranitidine (ZANTAC) 150 MG tablet Take 150 mg by mouth 2 times daily  Historical Provider, MD   tiZANidine (ZANAFLEX) 2 MG capsule Take 2 mg by mouth daily as needed for Muscle spasms  Historical Provider, MD   levothyroxine (SYNTHROID) 50 MCG tablet Take 50 mcg by mouth Daily  Historical Provider, MD   nabumetone (RELAFEN) 750 MG tablet Take 750 mg by mouth 2 times daily  Historical Provider, MD   Cholecalciferol (VITAMIN D) 2000 UNITS CAPS capsule Take 2,000 Units by mouth daily  Historical Provider, MD   SUMAtriptan (IMITREX) 50 MG tablet Take 50 mg by mouth once as needed for Migraine  Historical Provider, MD   Diltiazem HCl Coated Beads (DILTIAZEM CD PO) Take by mouth daily  Historical Provider, MD      Allergies   Allergen Reactions    Sulfa Antibiotics Rash       Past Medical History:   Diagnosis Date    Anemia     Arthritis, rheumatoid (HCC)     Fibromyalgia     GERD (gastroesophageal reflux disease)     Hypercholesterolemia     Hypothyroidism     Irritable bowel syndrome     Lump of right breast     Mononucleosis 03/2009    Seasonal allergies      Past Surgical History:   Procedure Laterality Date    APPENDECTOMY      CHOLECYSTECTOMY      COLON SURGERY      HYSTERECTOMY      JOINT REPLACEMENT      TONSILLECTOMY      TOTAL HIP ARTHROPLASTY      left      Social History     Tobacco Use    Smoking status: Never Smoker    Smokeless tobacco: Never Used   Substance Use Topics    Alcohol use: Never     Frequency: Never        There were no vitals filed for this visit.   Estimated body mass index is 27.06 kg/m² as calculated from the following:    Height as of an earlier encounter on 8/16/19: 5' 8\" (1.727 m). Weight as of 7/19/19: 178 lb (80.7 kg). Physical Exam   She is alert and oriented x3. Ambulates well with the walking boot and does not have any difficulty. EKG: Normal sinus rhythm    ASSESSMENT/PLAN:    ICD-10-CM    1. Pre-op exam Z01.818 EKG 12 Lead     EKG 12 Lead      She will have laboratory performed a week before. She already has an appointment with me for management of her narcotic pain medication. This visit will be 3 days prior to the surgery and final approval can be given at that time. No follow-ups on file. An electronic signature was used to authenticate this note.     --Lisa Hernandez, DO on 8/16/2019 at 2:18 PM

## 2019-08-16 NOTE — PROGRESS NOTES
19  Karma Gottron : 1960 Sex: female  Age: 66 y.o. Patient was referred by Justin Hurtado DO    Chief Complaint   Patient presents with    Foot Injury     pt fell down the steps a few weeks ago she has been walking on her left foot saw Dr. Carolyne Leung yesterday had xrays showing a fx that might need sx next Friday in cam walker        HPI: Patient is seen with her  regarding trauma to the left great toe patient approximately 10 to 12 days ago fell patient was seen yesterday by her PCP took an x-ray there was a displaced proximal phalanx first digit fracture patient has a long history of some injuries we did a first metatarsal phalangeal fusion on the right she is also had lower back surgery due to a dropfoot    ROS:  Const: Denies constitutional symptoms  Musculo: Denies symptoms other than stated above  Skin: Denies symptoms other than stated above       Current Outpatient Medications:     oxyCODONE-acetaminophen (PERCOCET)  MG per tablet, Take 1 tablet by mouth every 6 hours for 30 days. , Disp: 120 tablet, Rfl: 0    zolpidem (AMBIEN CR) 12.5 MG extended release tablet, Take 1 tablet by mouth nightly as needed for Sleep for up to 30 days. , Disp: 30 tablet, Rfl: 0    pregabalin (LYRICA) 50 MG capsule, Take 1 capsule by mouth 4 times daily for 30 days. , Disp: 120 capsule, Rfl: 2    neomycin-polymyxin-dexameth 3.5-31665-9.1 OINT, Place into both eyes 3 times daily, Disp: 3.5 g, Rfl: 2    cyclobenzaprine (FLEXERIL) 10 MG tablet, Take 1 tablet by mouth 3 times daily as needed for Muscle spasms, Disp: 90 tablet, Rfl: 3    esomeprazole (NEXIUM) 20 MG delayed release capsule, Take 20 mg by mouth nightly, Disp: , Rfl:     rosuvastatin (CRESTOR) 40 MG tablet, Take 40 mg by mouth daily, Disp: , Rfl:     mometasone (NASONEX) 50 MCG/ACT nasal spray, 2 sprays by Each Nostril route daily, Disp: , Rfl:     meloxicam (MOBIC) 15 MG tablet, Take 15 mg by mouth daily, Disp: , Rfl:     aspirin 83 Mother     Cancer Mother 68        colon    High Blood Pressure Father     Heart Disease Father     High Blood Pressure Brother     Diabetes Brother     Cancer Maternal Cousin         breast cancer    Cancer Paternal Cousin         breast cancer     Social History     Socioeconomic History    Marital status:      Spouse name: Not on file    Number of children: Not on file    Years of education: Not on file    Highest education level: Not on file   Occupational History    Not on file   Social Needs    Financial resource strain: Not on file    Food insecurity:     Worry: Not on file     Inability: Not on file    Transportation needs:     Medical: Not on file     Non-medical: Not on file   Tobacco Use    Smoking status: Never Smoker    Smokeless tobacco: Never Used   Substance and Sexual Activity    Alcohol use: Never     Frequency: Never    Drug use: Never    Sexual activity: Not on file   Lifestyle    Physical activity:     Days per week: Not on file     Minutes per session: Not on file    Stress: Not on file   Relationships    Social connections:     Talks on phone: Not on file     Gets together: Not on file     Attends Islam service: Not on file     Active member of club or organization: Not on file     Attends meetings of clubs or organizations: Not on file     Relationship status: Not on file    Intimate partner violence:     Fear of current or ex partner: Not on file     Emotionally abused: Not on file     Physically abused: Not on file     Forced sexual activity: Not on file   Other Topics Concern    Not on file   Social History Narrative    Not on file       Vitals:    08/16/19 1257   BP: (!) 143/91   Temp: 97.7 °F (36.5 °C)   TempSrc: Temporal   Weight: Comment: cam walker   Height: 5' 8\" (1.727 m)       Focused Lower Extremity Physical Exam:  Vitals:    08/16/19 1257   BP: (!) 143/91   Temp: 97.7 °F (36.5 °C)        Foot Exam    General  General Appearance: appears stated

## 2019-08-23 ENCOUNTER — TELEPHONE (OUTPATIENT)
Dept: PODIATRY | Age: 59
End: 2019-08-23

## 2019-08-26 ENCOUNTER — PREP FOR PROCEDURE (OUTPATIENT)
Dept: PODIATRY | Age: 59
End: 2019-08-26

## 2019-08-26 RX ORDER — SODIUM CHLORIDE 0.9 % (FLUSH) 0.9 %
10 SYRINGE (ML) INJECTION PRN
Status: CANCELLED | OUTPATIENT
Start: 2019-08-26

## 2019-08-26 RX ORDER — SODIUM CHLORIDE 0.9 % (FLUSH) 0.9 %
10 SYRINGE (ML) INJECTION EVERY 12 HOURS SCHEDULED
Status: CANCELLED | OUTPATIENT
Start: 2019-08-26

## 2019-09-06 ENCOUNTER — HOSPITAL ENCOUNTER (OUTPATIENT)
Age: 59
Discharge: HOME | End: 2019-09-08
Payer: COMMERCIAL

## 2019-09-06 DIAGNOSIS — M05.9 RHEUMATOID ARTHRITIS WITH POSITIVE RHEUMATOID FACTOR, INVOLVING UNSPECIFIED SITE (HCC): ICD-10-CM

## 2019-09-06 DIAGNOSIS — Z01.818 PRE-OP EXAM: ICD-10-CM

## 2019-09-06 LAB
ALBUMIN SERPL-MCNC: 4.5 G/DL (ref 3.5–5.2)
ALP BLD-CCNC: 87 U/L (ref 35–104)
ALT SERPL-CCNC: 8 U/L (ref 0–32)
ANION GAP SERPL CALCULATED.3IONS-SCNC: 13 MMOL/L (ref 7–16)
AST SERPL-CCNC: 15 U/L (ref 0–31)
BASOPHILS ABSOLUTE: 0.06 E9/L (ref 0–0.2)
BASOPHILS RELATIVE PERCENT: 1 % (ref 0–2)
BILIRUB SERPL-MCNC: 0.2 MG/DL (ref 0–1.2)
BILIRUBIN DIRECT: <0.2 MG/DL (ref 0–0.3)
BILIRUBIN, INDIRECT: NORMAL MG/DL (ref 0–1)
BUN BLDV-MCNC: 9 MG/DL (ref 6–20)
CALCIUM SERPL-MCNC: 9.4 MG/DL (ref 8.6–10.2)
CHLORIDE BLD-SCNC: 105 MMOL/L (ref 98–107)
CO2: 27 MMOL/L (ref 22–29)
CREAT SERPL-MCNC: 0.7 MG/DL (ref 0.5–1)
EOSINOPHILS ABSOLUTE: 0.17 E9/L (ref 0.05–0.5)
EOSINOPHILS RELATIVE PERCENT: 2.8 % (ref 0–6)
FOLATE: 8.6 NG/ML (ref 4.8–24.2)
GFR AFRICAN AMERICAN: >60
GFR NON-AFRICAN AMERICAN: >60 ML/MIN/1.73
GLUCOSE BLD-MCNC: 96 MG/DL (ref 74–99)
HCT VFR BLD CALC: 35.7 % (ref 34–48)
HEMOGLOBIN: 11.4 G/DL (ref 11.5–15.5)
IMMATURE GRANULOCYTES #: 0.03 E9/L
IMMATURE GRANULOCYTES %: 0.5 % (ref 0–5)
LYMPHOCYTES ABSOLUTE: 2.2 E9/L (ref 1.5–4)
LYMPHOCYTES RELATIVE PERCENT: 36.1 % (ref 20–42)
MCH RBC QN AUTO: 26.6 PG (ref 26–35)
MCHC RBC AUTO-ENTMCNC: 31.9 % (ref 32–34.5)
MCV RBC AUTO: 83.4 FL (ref 80–99.9)
MONOCYTES ABSOLUTE: 0.43 E9/L (ref 0.1–0.95)
MONOCYTES RELATIVE PERCENT: 7.1 % (ref 2–12)
NEUTROPHILS ABSOLUTE: 3.2 E9/L (ref 1.8–7.3)
NEUTROPHILS RELATIVE PERCENT: 52.5 % (ref 43–80)
PDW BLD-RTO: 14 FL (ref 11.5–15)
PHOSPHORUS: 3.6 MG/DL (ref 2.5–4.5)
PLATELET # BLD: 232 E9/L (ref 130–450)
PMV BLD AUTO: 11.4 FL (ref 7–12)
POTASSIUM SERPL-SCNC: 4.2 MMOL/L (ref 3.5–5)
RBC # BLD: 4.28 E12/L (ref 3.5–5.5)
SODIUM BLD-SCNC: 145 MMOL/L (ref 132–146)
TOTAL PROTEIN: 7.6 G/DL (ref 6.4–8.3)
VITAMIN B-12: 661 PG/ML (ref 211–946)
WBC # BLD: 6.1 E9/L (ref 4.5–11.5)

## 2019-09-06 PROCEDURE — 84155 ASSAY OF PROTEIN SERUM: CPT

## 2019-09-06 PROCEDURE — 82746 ASSAY OF FOLIC ACID SERUM: CPT

## 2019-09-06 PROCEDURE — 82247 BILIRUBIN TOTAL: CPT

## 2019-09-06 PROCEDURE — 84075 ASSAY ALKALINE PHOSPHATASE: CPT

## 2019-09-06 PROCEDURE — 80069 RENAL FUNCTION PANEL: CPT

## 2019-09-06 PROCEDURE — 85025 COMPLETE CBC W/AUTO DIFF WBC: CPT

## 2019-09-06 PROCEDURE — 84460 ALANINE AMINO (ALT) (SGPT): CPT

## 2019-09-06 PROCEDURE — 82607 VITAMIN B-12: CPT

## 2019-09-06 PROCEDURE — 86703 HIV-1/HIV-2 1 RESULT ANTBDY: CPT

## 2019-09-06 PROCEDURE — 86803 HEPATITIS C AB TEST: CPT

## 2019-09-06 PROCEDURE — 82248 BILIRUBIN DIRECT: CPT

## 2019-09-06 PROCEDURE — 36415 COLL VENOUS BLD VENIPUNCTURE: CPT

## 2019-09-06 PROCEDURE — 84450 TRANSFERASE (AST) (SGOT): CPT

## 2019-09-06 RX ORDER — NARATRIPTAN 1 MG/1
1 TABLET ORAL PRN
COMMUNITY
End: 2019-11-12 | Stop reason: SDUPTHER

## 2019-09-06 RX ORDER — CYCLOBENZAPRINE HCL 10 MG
10 TABLET ORAL 2 TIMES DAILY PRN
COMMUNITY
End: 2019-12-02 | Stop reason: SDUPTHER

## 2019-09-09 LAB
HEPATITIS C ANTIBODY INTERPRETATION: NORMAL
HIV-1 AND HIV-2 ANTIBODIES: NORMAL

## 2019-09-10 ENCOUNTER — OFFICE VISIT (OUTPATIENT)
Dept: FAMILY MEDICINE CLINIC | Age: 59
End: 2019-09-10
Payer: COMMERCIAL

## 2019-09-10 VITALS
DIASTOLIC BLOOD PRESSURE: 68 MMHG | OXYGEN SATURATION: 98 % | HEART RATE: 88 BPM | TEMPERATURE: 97 F | SYSTOLIC BLOOD PRESSURE: 124 MMHG

## 2019-09-10 DIAGNOSIS — M48.062 LUMBAR STENOSIS WITH NEUROGENIC CLAUDICATION: Primary | ICD-10-CM

## 2019-09-10 PROCEDURE — 99213 OFFICE O/P EST LOW 20 MIN: CPT | Performed by: INTERNAL MEDICINE

## 2019-09-10 RX ORDER — OXYCODONE AND ACETAMINOPHEN 10; 325 MG/1; MG/1
1 TABLET ORAL EVERY 6 HOURS
Qty: 120 TABLET | Refills: 0 | Status: SHIPPED | OUTPATIENT
Start: 2019-09-10 | End: 2019-10-15 | Stop reason: SDUPTHER

## 2019-09-10 RX ORDER — ORPHENADRINE CITRATE 100 MG/1
100 TABLET, EXTENDED RELEASE ORAL 2 TIMES DAILY
Qty: 60 TABLET | Refills: 2 | Status: ON HOLD | OUTPATIENT
Start: 2019-09-10 | End: 2019-09-13 | Stop reason: ALTCHOICE

## 2019-09-10 NOTE — PROGRESS NOTES
rosuvastatin (CRESTOR) 40 MG tablet Take 40 mg by mouth daily  Historical Provider, MD   mometasone (NASONEX) 50 MCG/ACT nasal spray 2 sprays by Each Nostril route daily  Historical Provider, MD   meloxicam (MOBIC) 15 MG tablet Take 15 mg by mouth daily  Historical Provider, MD   aspirin 81 MG tablet Take 81 mg by mouth daily Pt instructed to check hold with dr. Cira Jones Provider, MD   B Complex Vitamins (VITAMIN B COMPLEX) TABS Take by mouth 2 times daily Indications: 1 po bif (low B12 on 5/2018 labs) Ld 9/7/2019  Historical Provider, MD   pregabalin (LYRICA) 50 MG capsule Take 50 mg by mouth 4 times daily. Instructed to take am of procedure  Historical Provider, MD   fluticasone (FLONASE) 50 MCG/ACT nasal spray 1 spray by Each Nostril route daily  Rexann Holstein,    levothyroxine (SYNTHROID) 50 MCG tablet Take 50 mcg by mouth Daily  Historical Provider, MD   nabumetone (RELAFEN) 750 MG tablet Take 750 mg by mouth 2 times daily  Historical Provider, MD   Cholecalciferol (VITAMIN D) 2000 UNITS CAPS capsule Take 2,000 Units by mouth daily  Historical Provider, MD        Social History     Tobacco Use    Smoking status: Never Smoker    Smokeless tobacco: Never Used   Substance Use Topics    Alcohol use: Never     Frequency: Never        Vitals:    09/10/19 1435   BP: 124/68   Pulse: 88   Temp: 97 °F (36.1 °C)   SpO2: 98%     Estimated body mass index is 26.3 kg/m² as calculated from the following:    Height as of 9/6/19: 5' 8\" (1.727 m). Weight as of 9/6/19: 173 lb (78.5 kg). Physical Exam   Heart is regular rate and rhythm without gallops or clicks in the lungs are clear to auscultation without wheezes rales or rhonchi. Sinus mucosa shows pallor and edema with clear and clear drainage. No evidence of any injection. There is no palpable maxillary edema noted. Posterior pharynx was clear drainage. TMs are bilaterally intact without serous otitis.   Eyes are PERRL and EOMI sclerae white

## 2019-09-13 ENCOUNTER — APPOINTMENT (OUTPATIENT)
Dept: GENERAL RADIOLOGY | Age: 59
End: 2019-09-13
Attending: PODIATRIST
Payer: COMMERCIAL

## 2019-09-13 ENCOUNTER — HOSPITAL ENCOUNTER (OUTPATIENT)
Age: 59
Setting detail: OUTPATIENT SURGERY
Discharge: HOME | End: 2019-09-13
Attending: PODIATRIST | Admitting: PODIATRIST
Payer: COMMERCIAL

## 2019-09-13 ENCOUNTER — ANESTHESIA EVENT (OUTPATIENT)
Dept: OPERATING ROOM | Age: 59
End: 2019-09-13
Payer: COMMERCIAL

## 2019-09-13 ENCOUNTER — ANESTHESIA (OUTPATIENT)
Dept: OPERATING ROOM | Age: 59
End: 2019-09-13
Payer: COMMERCIAL

## 2019-09-13 VITALS
HEART RATE: 77 BPM | BODY MASS INDEX: 26.22 KG/M2 | OXYGEN SATURATION: 99 % | SYSTOLIC BLOOD PRESSURE: 131 MMHG | RESPIRATION RATE: 20 BRPM | TEMPERATURE: 97.2 F | DIASTOLIC BLOOD PRESSURE: 71 MMHG | WEIGHT: 173 LBS | HEIGHT: 68 IN

## 2019-09-13 VITALS — SYSTOLIC BLOOD PRESSURE: 127 MMHG | DIASTOLIC BLOOD PRESSURE: 76 MMHG | OXYGEN SATURATION: 100 %

## 2019-09-13 DIAGNOSIS — S92.422G CLOSED DISPLACED FRACTURE OF DISTAL PHALANX OF LEFT GREAT TOE WITH DELAYED HEALING, SUBSEQUENT ENCOUNTER: ICD-10-CM

## 2019-09-13 DIAGNOSIS — M79.675 PAIN IN LEFT TOE(S): Primary | ICD-10-CM

## 2019-09-13 PROCEDURE — 3600000012 HC SURGERY LEVEL 2 ADDTL 15MIN: Performed by: PODIATRIST

## 2019-09-13 PROCEDURE — 7100000010 HC PHASE II RECOVERY - FIRST 15 MIN: Performed by: PODIATRIST

## 2019-09-13 PROCEDURE — 28505 TREAT BIG TOE FRACTURE: CPT | Performed by: PODIATRIST

## 2019-09-13 PROCEDURE — C9359 IMPLNT,BON VOID FILLER-PUTTY: HCPCS | Performed by: PODIATRIST

## 2019-09-13 PROCEDURE — 3700000001 HC ADD 15 MINUTES (ANESTHESIA): Performed by: PODIATRIST

## 2019-09-13 PROCEDURE — 2500000003 HC RX 250 WO HCPCS: Performed by: PODIATRIST

## 2019-09-13 PROCEDURE — 3600000002 HC SURGERY LEVEL 2 BASE: Performed by: PODIATRIST

## 2019-09-13 PROCEDURE — 6370000000 HC RX 637 (ALT 250 FOR IP): Performed by: ANESTHESIOLOGY

## 2019-09-13 PROCEDURE — 6360000002 HC RX W HCPCS: Performed by: PODIATRIST

## 2019-09-13 PROCEDURE — 2500000003 HC RX 250 WO HCPCS: Performed by: NURSE ANESTHETIST, CERTIFIED REGISTERED

## 2019-09-13 PROCEDURE — 3700000000 HC ANESTHESIA ATTENDED CARE: Performed by: PODIATRIST

## 2019-09-13 PROCEDURE — 7100000011 HC PHASE II RECOVERY - ADDTL 15 MIN: Performed by: PODIATRIST

## 2019-09-13 PROCEDURE — C1713 ANCHOR/SCREW BN/BN,TIS/BN: HCPCS | Performed by: PODIATRIST

## 2019-09-13 PROCEDURE — 6360000002 HC RX W HCPCS: Performed by: NURSE ANESTHETIST, CERTIFIED REGISTERED

## 2019-09-13 PROCEDURE — 2709999900 HC NON-CHARGEABLE SUPPLY: Performed by: PODIATRIST

## 2019-09-13 PROCEDURE — 3209999900 FLUORO FOR SURGICAL PROCEDURES

## 2019-09-13 PROCEDURE — 2580000003 HC RX 258: Performed by: NURSE ANESTHETIST, CERTIFIED REGISTERED

## 2019-09-13 DEVICE — DBX PUTTY, 1CC
Type: IMPLANTABLE DEVICE | Site: FIRST TOE | Status: FUNCTIONAL
Brand: DBX®

## 2019-09-13 DEVICE — K WIRE FIX L150MM DIA1.6MM S STL THRD TRCR PNT: Type: IMPLANTABLE DEVICE | Status: FUNCTIONAL

## 2019-09-13 DEVICE — SCREW BNE L20MM DIA3.5MM CORT S STL ST NONCANNULATED LOK: Type: IMPLANTABLE DEVICE | Site: FIRST TOE | Status: FUNCTIONAL

## 2019-09-13 RX ORDER — PROPOFOL 10 MG/ML
INJECTION, EMULSION INTRAVENOUS PRN
Status: DISCONTINUED | OUTPATIENT
Start: 2019-09-13 | End: 2019-09-13

## 2019-09-13 RX ORDER — LIDOCAINE HYDROCHLORIDE 20 MG/ML
INJECTION, SOLUTION EPIDURAL; INFILTRATION; INTRACAUDAL; PERINEURAL PRN
Status: DISCONTINUED | OUTPATIENT
Start: 2019-09-13 | End: 2019-09-13 | Stop reason: SDUPTHER

## 2019-09-13 RX ORDER — MIDAZOLAM HYDROCHLORIDE 1 MG/ML
INJECTION INTRAMUSCULAR; INTRAVENOUS PRN
Status: DISCONTINUED | OUTPATIENT
Start: 2019-09-13 | End: 2019-09-13 | Stop reason: SDUPTHER

## 2019-09-13 RX ORDER — PROMETHAZINE HYDROCHLORIDE 25 MG/ML
6.25 INJECTION, SOLUTION INTRAMUSCULAR; INTRAVENOUS
Status: DISCONTINUED | OUTPATIENT
Start: 2019-09-13 | End: 2019-09-13 | Stop reason: HOSPADM

## 2019-09-13 RX ORDER — HYDRALAZINE HYDROCHLORIDE 20 MG/ML
5 INJECTION INTRAMUSCULAR; INTRAVENOUS EVERY 10 MIN PRN
Status: DISCONTINUED | OUTPATIENT
Start: 2019-09-13 | End: 2019-09-13 | Stop reason: HOSPADM

## 2019-09-13 RX ORDER — LABETALOL HYDROCHLORIDE 5 MG/ML
5 INJECTION, SOLUTION INTRAVENOUS EVERY 10 MIN PRN
Status: DISCONTINUED | OUTPATIENT
Start: 2019-09-13 | End: 2019-09-13 | Stop reason: HOSPADM

## 2019-09-13 RX ORDER — PROPOFOL 10 MG/ML
INJECTION, EMULSION INTRAVENOUS CONTINUOUS PRN
Status: DISCONTINUED | OUTPATIENT
Start: 2019-09-13 | End: 2019-09-13 | Stop reason: SDUPTHER

## 2019-09-13 RX ORDER — SODIUM CHLORIDE 9 MG/ML
INJECTION, SOLUTION INTRAVENOUS CONTINUOUS PRN
Status: DISCONTINUED | OUTPATIENT
Start: 2019-09-13 | End: 2019-09-13 | Stop reason: SDUPTHER

## 2019-09-13 RX ORDER — SODIUM CHLORIDE 0.9 % (FLUSH) 0.9 %
10 SYRINGE (ML) INJECTION PRN
Status: DISCONTINUED | OUTPATIENT
Start: 2019-09-13 | End: 2019-09-13 | Stop reason: HOSPADM

## 2019-09-13 RX ORDER — MEPERIDINE HYDROCHLORIDE 25 MG/ML
12.5 INJECTION INTRAMUSCULAR; INTRAVENOUS; SUBCUTANEOUS EVERY 5 MIN PRN
Status: DISCONTINUED | OUTPATIENT
Start: 2019-09-13 | End: 2019-09-13 | Stop reason: HOSPADM

## 2019-09-13 RX ORDER — OXYCODONE AND ACETAMINOPHEN 7.5; 325 MG/1; MG/1
1 TABLET ORAL EVERY 6 HOURS PRN
Qty: 20 TABLET | Refills: 0 | Status: SHIPPED | OUTPATIENT
Start: 2019-09-13 | End: 2019-09-18

## 2019-09-13 RX ORDER — SODIUM CHLORIDE 0.9 % (FLUSH) 0.9 %
10 SYRINGE (ML) INJECTION EVERY 12 HOURS SCHEDULED
Status: DISCONTINUED | OUTPATIENT
Start: 2019-09-13 | End: 2019-09-13 | Stop reason: HOSPADM

## 2019-09-13 RX ORDER — OXYCODONE HYDROCHLORIDE AND ACETAMINOPHEN 5; 325 MG/1; MG/1
1 TABLET ORAL ONCE
Status: COMPLETED | OUTPATIENT
Start: 2019-09-13 | End: 2019-09-13

## 2019-09-13 RX ORDER — BUPIVACAINE HYDROCHLORIDE 5 MG/ML
INJECTION, SOLUTION EPIDURAL; INTRACAUDAL PRN
Status: DISCONTINUED | OUTPATIENT
Start: 2019-09-13 | End: 2019-09-13 | Stop reason: ALTCHOICE

## 2019-09-13 RX ORDER — FENTANYL CITRATE 50 UG/ML
INJECTION, SOLUTION INTRAMUSCULAR; INTRAVENOUS PRN
Status: DISCONTINUED | OUTPATIENT
Start: 2019-09-13 | End: 2019-09-13 | Stop reason: SDUPTHER

## 2019-09-13 RX ADMIN — MIDAZOLAM HYDROCHLORIDE 2 MG: 1 INJECTION, SOLUTION INTRAMUSCULAR; INTRAVENOUS at 12:56

## 2019-09-13 RX ADMIN — OXYCODONE HYDROCHLORIDE AND ACETAMINOPHEN 1 TABLET: 5; 325 TABLET ORAL at 14:10

## 2019-09-13 RX ADMIN — FENTANYL CITRATE 25 MCG: 50 INJECTION, SOLUTION INTRAMUSCULAR; INTRAVENOUS at 13:23

## 2019-09-13 RX ADMIN — SODIUM CHLORIDE: 9 INJECTION, SOLUTION INTRAVENOUS at 12:54

## 2019-09-13 RX ADMIN — FENTANYL CITRATE 25 MCG: 50 INJECTION, SOLUTION INTRAMUSCULAR; INTRAVENOUS at 13:31

## 2019-09-13 RX ADMIN — PROPOFOL 75 MCG/KG/MIN: 10 INJECTION, EMULSION INTRAVENOUS at 13:00

## 2019-09-13 RX ADMIN — LIDOCAINE HYDROCHLORIDE 60 MG: 20 INJECTION, SOLUTION EPIDURAL; INFILTRATION; INTRACAUDAL; PERINEURAL at 13:00

## 2019-09-13 RX ADMIN — Medication 2 G: at 12:54

## 2019-09-13 RX ADMIN — FENTANYL CITRATE 50 MCG: 50 INJECTION, SOLUTION INTRAMUSCULAR; INTRAVENOUS at 13:00

## 2019-09-13 ASSESSMENT — PAIN SCALES - GENERAL: PAINLEVEL_OUTOF10: 6

## 2019-09-13 ASSESSMENT — PULMONARY FUNCTION TESTS
PIF_VALUE: 0
PIF_VALUE: 0
PIF_VALUE: 1
PIF_VALUE: 0
PIF_VALUE: 0
PIF_VALUE: 1
PIF_VALUE: 1
PIF_VALUE: 0
PIF_VALUE: 1
PIF_VALUE: 0
PIF_VALUE: 1
PIF_VALUE: 0
PIF_VALUE: 0
PIF_VALUE: 1
PIF_VALUE: 1

## 2019-09-13 ASSESSMENT — PAIN - FUNCTIONAL ASSESSMENT: PAIN_FUNCTIONAL_ASSESSMENT: 0-10

## 2019-09-13 ASSESSMENT — PAIN DESCRIPTION - DESCRIPTORS: DESCRIPTORS: DISCOMFORT

## 2019-09-13 NOTE — ANESTHESIA PRE PROCEDURE
Department of Anesthesiology  Preprocedure Note       Name:  Kerri Antunez   Age:  64 y.o.  :  1960                                          MRN:  [id number]         Date:  2019      Surgeon: Dana Mckenzie):  Gwyn Durant DPM    Procedure: LEFT GREAT PHALANX OPEN REDUCTION INTERNAL FIXATION (SYNTHES) (Left )    Medications prior to admission:   Prior to Admission medications    Medication Sig Start Date End Date Taking? Authorizing Provider   naratriptan HCl (AMERGE) 1 MG tablet Take 1 mg by mouth as needed   Yes Historical Provider, MD   cyclobenzaprine (FLEXERIL) 10 MG tablet Take 10 mg by mouth 2 times daily as needed for Muscle spasms Instructed to take am of procedure if needed   Yes Historical Provider, MD   oxyCODONE-acetaminophen (PERCOCET)  MG per tablet Take 1 tablet by mouth every 6 hours for 30 days. 9/10/19 10/10/19  Ping Blanca,    orphenadrine (NORFLEX) 100 MG extended release tablet Take 1 tablet by mouth 2 times daily 9/10/19   Ping Blanca DO   zolpidem (AMBIEN CR) 12.5 MG extended release tablet Take 1 tablet by mouth nightly as needed for Sleep for up to 30 days.  8/15/19 9/14/19  Ping Blanca DO   naratriptan HCl (AMERGE) 1 MG tablet Take 1 tablet by mouth once for 1 dose 1 po q 12 prn migraine 8/15/19 8/15/19  Ping Blanca DO   neomycin-polymyxin-dexameth 3.5-07241-5.1 OINT Place into both eyes 3 times daily 8/15/19   Ping Blanca DO   esomeprazole (NEXIUM) 20 MG delayed release capsule Take 20 mg by mouth nightly 19   Historical Provider, MD   rosuvastatin (CRESTOR) 40 MG tablet Take 40 mg by mouth daily 12/10/18   Historical Provider, MD   mometasone (NASONEX) 50 MCG/ACT nasal spray 2 sprays by Each Nostril route daily 18   Historical Provider, MD   meloxicam (MOBIC) 15 MG tablet Take 15 mg by mouth daily 16   Historical Provider, MD   aspirin 81 MG tablet Take 81 mg by mouth daily Pt instructed to check hold with

## 2019-09-13 NOTE — PROGRESS NOTES
CLINICAL PHARMACY NOTE: MEDS TO [address] Select Patient?: Yes  Total # of Prescriptions Filled: 1   The following medications were delivered to the patient:  · oxycodone 7.5-325  Total # of Interventions Completed: 5  Time Spent (min): 45    Additional Documentation:

## 2019-09-16 ENCOUNTER — OFFICE VISIT (OUTPATIENT)
Dept: PODIATRY | Age: 59
End: 2019-09-16

## 2019-09-16 VITALS — DIASTOLIC BLOOD PRESSURE: 84 MMHG | SYSTOLIC BLOOD PRESSURE: 136 MMHG | TEMPERATURE: 97.5 F

## 2019-09-16 DIAGNOSIS — S92.422A CLOSED DISPLACED FRACTURE OF DISTAL PHALANX OF LEFT GREAT TOE, INITIAL ENCOUNTER: Primary | ICD-10-CM

## 2019-09-16 PROCEDURE — 99024 POSTOP FOLLOW-UP VISIT: CPT | Performed by: PODIATRIST

## 2019-09-16 NOTE — PROGRESS NOTES
Intraoperative fluoroscopy of ORIF of the left great toe The exam has been dictated and signed by Seth Chow. Conrad Morrison, APRN-CNP. Latosha Hebert MD, Radiologist, have reviewed the images and report and concur with these findings. Assessment: Soto Ruiz is status post post open reduction internal fixation patient is doing very well patient will be seen weightbearing cam walker  Normal post operative course. Doing well          ICD-10-CM    1. Closed displaced fracture of distal phalanx of left great toe, initial encounter S92.422A XR FOOT LEFT (MIN 3 VIEWS)         Plan:  Patient examined and evaluated. Current condition and treatment options discussed in detail. Advised pt to stop cam walker limited weightbearing. Verbal and written instructions given to patient. Orders:   Orders Placed This Encounter   Procedures    XR FOOT LEFT (MIN 3 VIEWS)     Standing Status:   Future     Number of Occurrences:   1     Standing Expiration Date:   9/16/2020     Order Specific Question:   Reason for exam:     Answer:   non weight bearing      Contact office with any questions/problems/concerns. RTC in 1week(s).      Electronically signed by Grace Shah DPM on 9/16/2019 at 10:38 AM  9/16/2019

## 2019-09-23 PROCEDURE — 90653 IIV ADJUVANT VACCINE IM: CPT | Performed by: INTERNAL MEDICINE

## 2019-09-23 PROCEDURE — 90471 IMMUNIZATION ADMIN: CPT | Performed by: INTERNAL MEDICINE

## 2019-09-24 ENCOUNTER — TELEPHONE (OUTPATIENT)
Dept: ADMINISTRATIVE | Age: 59
End: 2019-09-24

## 2019-09-26 ENCOUNTER — TELEPHONE (OUTPATIENT)
Dept: ADMINISTRATIVE | Age: 59
End: 2019-09-26

## 2019-09-30 ENCOUNTER — OFFICE VISIT (OUTPATIENT)
Dept: PODIATRY | Age: 59
End: 2019-09-30

## 2019-09-30 VITALS — DIASTOLIC BLOOD PRESSURE: 82 MMHG | TEMPERATURE: 97.6 F | SYSTOLIC BLOOD PRESSURE: 122 MMHG

## 2019-09-30 DIAGNOSIS — M79.675 PAIN IN LEFT TOE(S): ICD-10-CM

## 2019-09-30 DIAGNOSIS — S92.422A CLOSED DISPLACED FRACTURE OF DISTAL PHALANX OF LEFT GREAT TOE, INITIAL ENCOUNTER: Primary | ICD-10-CM

## 2019-09-30 PROCEDURE — 99024 POSTOP FOLLOW-UP VISIT: CPT | Performed by: PODIATRIST

## 2019-10-04 DIAGNOSIS — G47.00 INSOMNIA, UNSPECIFIED TYPE: Primary | ICD-10-CM

## 2019-10-05 RX ORDER — ZOLPIDEM TARTRATE 12.5 MG/1
12.5 TABLET, FILM COATED, EXTENDED RELEASE ORAL NIGHTLY PRN
Qty: 30 TABLET | Refills: 0 | Status: SHIPPED | OUTPATIENT
Start: 2019-10-05 | End: 2019-11-04 | Stop reason: SDUPTHER

## 2019-10-08 LAB
ALBUMIN SERPL-MCNC: 3.7 G/DL
ALP BLD-CCNC: 74 U/L
ALT SERPL-CCNC: 12 U/L
ANION GAP SERPL CALCULATED.3IONS-SCNC: 9 MMOL/L
AST SERPL-CCNC: 22 U/L
BASOPHILS ABSOLUTE: 0.1 /ΜL
BASOPHILS RELATIVE PERCENT: 1 %
BILIRUB SERPL-MCNC: 0.4 MG/DL (ref 0.1–1.4)
BILIRUBIN, URINE: NEGATIVE
BLOOD, URINE: NEGATIVE
BUN BLDV-MCNC: 11 MG/DL
CALCIUM SERPL-MCNC: 9.2 MG/DL
CHLORIDE BLD-SCNC: 105 MMOL/L
CLARITY: CLEAR
CO2: 27 MMOL/L
COLOR: YELLOW
CREAT SERPL-MCNC: 0.7 MG/DL
EOSINOPHILS ABSOLUTE: 0.2 /ΜL
EOSINOPHILS RELATIVE PERCENT: 3.6 %
GFR CALCULATED: 86
GLUCOSE BLD-MCNC: 84 MG/DL
GLUCOSE URINE: NORMAL
HCT VFR BLD CALC: 33.2 % (ref 36–46)
HEMOGLOBIN: 10.9 G/DL (ref 12–16)
KETONES, URINE: NEGATIVE
LEUKOCYTE ESTERASE, URINE: NORMAL
LYMPHOCYTES ABSOLUTE: 1.5 /ΜL
LYMPHOCYTES RELATIVE PERCENT: 28.5 %
MCH RBC QN AUTO: 26.3 PG
MCHC RBC AUTO-ENTMCNC: 32.9 G/DL
MCV RBC AUTO: 80.1 FL
MONOCYTES ABSOLUTE: 0.5 /ΜL
MONOCYTES RELATIVE PERCENT: 8.8 %
NEUTROPHILS ABSOLUTE: 3 /ΜL
NEUTROPHILS RELATIVE PERCENT: 58.1 %
NITRITE, URINE: NEGATIVE
PH UA: 8 (ref 4.5–8)
PLATELET # BLD: 177 K/ΜL
PMV BLD AUTO: 8.5 FL
POTASSIUM SERPL-SCNC: 4.4 MMOL/L
PROTEIN UA: NEGATIVE
RBC # BLD: 4.14 10^6/ΜL
SODIUM BLD-SCNC: 141 MMOL/L
SPECIFIC GRAVITY, URINE: 1
TOTAL PROTEIN: 7.2
UROBILINOGEN, URINE: NORMAL
WBC # BLD: 5.1 10^3/ML

## 2019-10-15 ENCOUNTER — OFFICE VISIT (OUTPATIENT)
Dept: FAMILY MEDICINE CLINIC | Age: 59
End: 2019-10-15
Payer: COMMERCIAL

## 2019-10-15 VITALS
SYSTOLIC BLOOD PRESSURE: 112 MMHG | BODY MASS INDEX: 26.52 KG/M2 | WEIGHT: 175 LBS | HEIGHT: 68 IN | HEART RATE: 101 BPM | DIASTOLIC BLOOD PRESSURE: 74 MMHG | TEMPERATURE: 97.3 F | OXYGEN SATURATION: 99 %

## 2019-10-15 DIAGNOSIS — M79.675 PAIN IN LEFT TOE(S): ICD-10-CM

## 2019-10-15 DIAGNOSIS — M48.062 LUMBAR STENOSIS WITH NEUROGENIC CLAUDICATION: ICD-10-CM

## 2019-10-15 DIAGNOSIS — S92.422G CLOSED DISPLACED FRACTURE OF DISTAL PHALANX OF LEFT GREAT TOE WITH DELAYED HEALING, SUBSEQUENT ENCOUNTER: ICD-10-CM

## 2019-10-15 PROCEDURE — 99213 OFFICE O/P EST LOW 20 MIN: CPT | Performed by: INTERNAL MEDICINE

## 2019-10-15 RX ORDER — OXYCODONE AND ACETAMINOPHEN 10; 325 MG/1; MG/1
1 TABLET ORAL EVERY 6 HOURS
Qty: 120 TABLET | Refills: 0 | Status: SHIPPED | OUTPATIENT
Start: 2019-10-15 | End: 2019-11-12 | Stop reason: SDUPTHER

## 2019-10-15 RX ORDER — OXYCODONE AND ACETAMINOPHEN 7.5; 325 MG/1; MG/1
1 TABLET ORAL EVERY 6 HOURS PRN
Qty: 20 TABLET | Refills: 0 | Status: CANCELLED | OUTPATIENT
Start: 2019-10-15 | End: 2019-10-20

## 2019-10-15 ASSESSMENT — PATIENT HEALTH QUESTIONNAIRE - PHQ9
SUM OF ALL RESPONSES TO PHQ QUESTIONS 1-9: 0
1. LITTLE INTEREST OR PLEASURE IN DOING THINGS: 0
SUM OF ALL RESPONSES TO PHQ QUESTIONS 1-9: 0
SUM OF ALL RESPONSES TO PHQ9 QUESTIONS 1 & 2: 0
2. FEELING DOWN, DEPRESSED OR HOPELESS: 0

## 2019-10-19 ENCOUNTER — OFFICE VISIT (OUTPATIENT)
Dept: PODIATRY | Age: 59
End: 2019-10-19

## 2019-10-19 VITALS — TEMPERATURE: 98 F | DIASTOLIC BLOOD PRESSURE: 78 MMHG | SYSTOLIC BLOOD PRESSURE: 126 MMHG

## 2019-10-19 DIAGNOSIS — M79.675 PAIN IN LEFT TOE(S): ICD-10-CM

## 2019-10-19 DIAGNOSIS — S92.422A CLOSED DISPLACED FRACTURE OF DISTAL PHALANX OF LEFT GREAT TOE, INITIAL ENCOUNTER: Primary | ICD-10-CM

## 2019-10-19 PROCEDURE — 99024 POSTOP FOLLOW-UP VISIT: CPT | Performed by: PODIATRIST

## 2019-11-04 DIAGNOSIS — G47.00 INSOMNIA, UNSPECIFIED TYPE: ICD-10-CM

## 2019-11-05 RX ORDER — ZOLPIDEM TARTRATE 12.5 MG/1
12.5 TABLET, FILM COATED, EXTENDED RELEASE ORAL NIGHTLY PRN
Qty: 30 TABLET | Refills: 0 | Status: SHIPPED | OUTPATIENT
Start: 2019-11-05 | End: 2019-12-05

## 2019-11-12 ENCOUNTER — OFFICE VISIT (OUTPATIENT)
Dept: FAMILY MEDICINE CLINIC | Age: 59
End: 2019-11-12
Payer: COMMERCIAL

## 2019-11-12 VITALS
DIASTOLIC BLOOD PRESSURE: 68 MMHG | HEIGHT: 68 IN | OXYGEN SATURATION: 99 % | HEART RATE: 110 BPM | WEIGHT: 188 LBS | SYSTOLIC BLOOD PRESSURE: 110 MMHG | TEMPERATURE: 97.9 F | BODY MASS INDEX: 28.49 KG/M2

## 2019-11-12 DIAGNOSIS — M48.062 LUMBAR STENOSIS WITH NEUROGENIC CLAUDICATION: ICD-10-CM

## 2019-11-12 DIAGNOSIS — I10 ESSENTIAL HYPERTENSION, BENIGN: ICD-10-CM

## 2019-11-12 DIAGNOSIS — G89.4 CHRONIC PAIN SYNDROME: Primary | ICD-10-CM

## 2019-11-12 DIAGNOSIS — M05.9 RHEUMATOID ARTHRITIS WITH POSITIVE RHEUMATOID FACTOR, INVOLVING UNSPECIFIED SITE (HCC): ICD-10-CM

## 2019-11-12 PROCEDURE — 99213 OFFICE O/P EST LOW 20 MIN: CPT | Performed by: INTERNAL MEDICINE

## 2019-11-12 PROCEDURE — 90471 IMMUNIZATION ADMIN: CPT | Performed by: INTERNAL MEDICINE

## 2019-11-12 PROCEDURE — 90670 PCV13 VACCINE IM: CPT | Performed by: INTERNAL MEDICINE

## 2019-11-12 RX ORDER — LEVOTHYROXINE SODIUM 0.05 MG/1
50 TABLET ORAL DAILY
Qty: 90 TABLET | Refills: 1 | Status: SHIPPED
Start: 2019-11-12 | End: 2020-06-30 | Stop reason: SDUPTHER

## 2019-11-12 RX ORDER — OXYCODONE AND ACETAMINOPHEN 10; 325 MG/1; MG/1
1 TABLET ORAL EVERY 6 HOURS
Qty: 120 TABLET | Refills: 0 | Status: SHIPPED | OUTPATIENT
Start: 2019-11-12 | End: 2019-12-09 | Stop reason: SDUPTHER

## 2019-11-12 RX ORDER — NARATRIPTAN 1 MG/1
1 TABLET ORAL ONCE
Qty: 12 TABLET | Refills: 0 | Status: SHIPPED | OUTPATIENT
Start: 2019-11-12 | End: 2019-12-09 | Stop reason: SDUPTHER

## 2019-11-12 RX ORDER — MELOXICAM 15 MG/1
15 TABLET ORAL DAILY
Qty: 90 TABLET | Refills: 1 | Status: SHIPPED
Start: 2019-11-12 | End: 2020-06-30 | Stop reason: SDUPTHER

## 2019-11-12 RX ORDER — ROSUVASTATIN CALCIUM 40 MG/1
40 TABLET, COATED ORAL DAILY
Qty: 90 TABLET | Refills: 1 | Status: SHIPPED | OUTPATIENT
Start: 2019-11-12 | End: 2020-01-07 | Stop reason: SDUPTHER

## 2019-11-22 ENCOUNTER — TELEPHONE (OUTPATIENT)
Dept: FAMILY MEDICINE CLINIC | Age: 59
End: 2019-11-22

## 2019-12-02 RX ORDER — CYCLOBENZAPRINE HCL 10 MG
10 TABLET ORAL 2 TIMES DAILY PRN
Qty: 90 TABLET | Refills: 3 | Status: SHIPPED
Start: 2019-12-02 | End: 2020-05-26 | Stop reason: SDUPTHER

## 2019-12-09 ENCOUNTER — OFFICE VISIT (OUTPATIENT)
Dept: FAMILY MEDICINE CLINIC | Age: 59
End: 2019-12-09
Payer: COMMERCIAL

## 2019-12-09 VITALS
OXYGEN SATURATION: 98 % | BODY MASS INDEX: 27.43 KG/M2 | TEMPERATURE: 97.8 F | WEIGHT: 181 LBS | HEART RATE: 112 BPM | HEIGHT: 68 IN | SYSTOLIC BLOOD PRESSURE: 110 MMHG | DIASTOLIC BLOOD PRESSURE: 74 MMHG

## 2019-12-09 DIAGNOSIS — G62.9 NEUROPATHY: Primary | ICD-10-CM

## 2019-12-09 DIAGNOSIS — M06.9 RHEUMATOID ARTHRITIS, INVOLVING UNSPECIFIED SITE, UNSPECIFIED RHEUMATOID FACTOR PRESENCE: ICD-10-CM

## 2019-12-09 DIAGNOSIS — M48.062 LUMBAR STENOSIS WITH NEUROGENIC CLAUDICATION: ICD-10-CM

## 2019-12-09 PROCEDURE — 99213 OFFICE O/P EST LOW 20 MIN: CPT | Performed by: INTERNAL MEDICINE

## 2019-12-09 RX ORDER — NARATRIPTAN 1 MG/1
1 TABLET ORAL ONCE
Qty: 12 TABLET | Refills: 0 | Status: SHIPPED | OUTPATIENT
Start: 2019-12-09 | End: 2020-01-07 | Stop reason: SDUPTHER

## 2019-12-09 RX ORDER — GABAPENTIN 100 MG/1
CAPSULE ORAL
Qty: 100 CAPSULE | Refills: 0 | Status: SHIPPED | OUTPATIENT
Start: 2019-12-09 | End: 2019-12-09 | Stop reason: SDUPTHER

## 2019-12-09 RX ORDER — GABAPENTIN 300 MG/1
CAPSULE ORAL
Qty: 60 CAPSULE | Refills: 2 | Status: SHIPPED | OUTPATIENT
Start: 2019-12-09 | End: 2019-12-09 | Stop reason: SDUPTHER

## 2019-12-09 RX ORDER — GABAPENTIN 100 MG/1
CAPSULE ORAL
Qty: 100 CAPSULE | Refills: 0 | Status: SHIPPED | OUTPATIENT
Start: 2019-12-09 | End: 2020-01-07 | Stop reason: SDUPTHER

## 2019-12-09 RX ORDER — ORPHENADRINE CITRATE 100 MG/1
TABLET, EXTENDED RELEASE ORAL
Refills: 0 | COMMUNITY
Start: 2019-11-20 | End: 2019-12-24 | Stop reason: SDUPTHER

## 2019-12-09 RX ORDER — OXYCODONE AND ACETAMINOPHEN 10; 325 MG/1; MG/1
1 TABLET ORAL EVERY 6 HOURS
Qty: 120 TABLET | Refills: 0 | Status: SHIPPED | OUTPATIENT
Start: 2019-12-09 | End: 2020-01-07 | Stop reason: SDUPTHER

## 2019-12-09 RX ORDER — ZOLPIDEM TARTRATE 12.5 MG/1
TABLET, FILM COATED, EXTENDED RELEASE ORAL
Refills: 0 | COMMUNITY
Start: 2019-12-05 | End: 2020-02-04 | Stop reason: SDUPTHER

## 2019-12-11 ENCOUNTER — TELEPHONE (OUTPATIENT)
Dept: FAMILY MEDICINE CLINIC | Age: 59
End: 2019-12-11

## 2019-12-24 RX ORDER — ORPHENADRINE CITRATE 100 MG/1
100 TABLET, EXTENDED RELEASE ORAL 2 TIMES DAILY
Qty: 60 TABLET | Refills: 1 | Status: SHIPPED
Start: 2019-12-24 | End: 2020-02-17 | Stop reason: SDUPTHER

## 2020-01-03 RX ORDER — SUMATRIPTAN 100 MG/1
TABLET, FILM COATED ORAL
Qty: 27 TABLET | Refills: 2 | OUTPATIENT
Start: 2020-01-03 | End: 2020-02-04

## 2020-01-07 ENCOUNTER — OFFICE VISIT (OUTPATIENT)
Dept: FAMILY MEDICINE CLINIC | Age: 60
End: 2020-01-07
Payer: COMMERCIAL

## 2020-01-07 VITALS
OXYGEN SATURATION: 97 % | HEART RATE: 113 BPM | SYSTOLIC BLOOD PRESSURE: 122 MMHG | TEMPERATURE: 97.4 F | DIASTOLIC BLOOD PRESSURE: 78 MMHG

## 2020-01-07 PROCEDURE — 99214 OFFICE O/P EST MOD 30 MIN: CPT | Performed by: INTERNAL MEDICINE

## 2020-01-07 RX ORDER — ROSUVASTATIN CALCIUM 40 MG/1
40 TABLET, COATED ORAL DAILY
Qty: 90 TABLET | Refills: 1 | Status: SHIPPED | OUTPATIENT
Start: 2020-01-07 | End: 2020-02-04 | Stop reason: SDUPTHER

## 2020-01-07 RX ORDER — NARATRIPTAN 1 MG/1
1 TABLET ORAL ONCE
Qty: 12 TABLET | Refills: 0 | Status: SHIPPED | OUTPATIENT
Start: 2020-01-07 | End: 2020-02-04 | Stop reason: SDUPTHER

## 2020-01-07 RX ORDER — OXYCODONE AND ACETAMINOPHEN 10; 325 MG/1; MG/1
1 TABLET ORAL EVERY 6 HOURS
Qty: 120 TABLET | Refills: 0 | Status: SHIPPED | OUTPATIENT
Start: 2020-01-07 | End: 2020-02-04 | Stop reason: SDUPTHER

## 2020-01-07 RX ORDER — GABAPENTIN 100 MG/1
CAPSULE ORAL
Qty: 120 CAPSULE | Refills: 0 | Status: SHIPPED | OUTPATIENT
Start: 2020-01-07 | End: 2020-02-04 | Stop reason: SDUPTHER

## 2020-01-07 ASSESSMENT — PATIENT HEALTH QUESTIONNAIRE - PHQ9
SUM OF ALL RESPONSES TO PHQ QUESTIONS 1-9: 0
2. FEELING DOWN, DEPRESSED OR HOPELESS: 0
SUM OF ALL RESPONSES TO PHQ9 QUESTIONS 1 & 2: 0
1. LITTLE INTEREST OR PLEASURE IN DOING THINGS: 0
SUM OF ALL RESPONSES TO PHQ QUESTIONS 1-9: 0

## 2020-01-07 NOTE — PROGRESS NOTES
2020     Alisia Childress (:  1960) is a 64 y.o. female, here for evaluation of the following medical concerns:  She is feeling a grinding sensation again in the left hip. She is already been to see orthopedics once about the knee. I told her she should follow-up with orthopedics again as this is already had a hip arthroplasty performed. He is also having some green discharge from her sinuses in the last 2 days. Headaches and congestion for the last week. No fevers or chills. She was off the Lyrica for about 2 weeks and notices significant increase in the amount of her peripheral neuropathy. I started her then on gabapentin instead. OARRS: 2020   Pain management agreement: 10-15-19  PHQ-9: 18  Urine drug screen: 18  Pain assessment questionnaire:  She does not use alcohol or tobacco. She is recently had lumbar discectomy performed by neurosurgery. She had an L3- 4- 5 fusion performed on May 7, 2019. Her pain at its worse is an 9/10. Is also has rheumatoid arthritis with positive rheumatoid factor. Is under the care of rheumatology however they will not treat the chronic pain. Lyrica would not be covered by her insurance company. She was able to start gabapentin and we are slowly titrating this. She has noticed significant improvement with the pain since starting the gabapentin. Review of Systems    Prior to Visit Medications    Medication Sig Taking? Authorizing Provider   oxyCODONE-acetaminophen (PERCOCET)  MG per tablet Take 1 tablet by mouth every 6 hours for 30 days.  Yes Michele Mendoza, DO   gabapentin (NEURONTIN) 100 MG capsule 1 po at hs for 2 weeks then 1 po BID Yes Michele Mendoza, DO   rosuvastatin (CRESTOR) 40 MG tablet Take 1 tablet by mouth daily Yes Michele Mendoza, DO   esomeprazole (NEXIUM) 20 MG delayed release capsule Take 1 capsule by mouth nightly Yes Michele Mendoza, DO   SUMAtriptan (IMITREX) 100 MG tablet take 1 97.4 °F (36.3 °C)   SpO2: 97%     Estimated body mass index is 27.52 kg/m² as calculated from the following:    Height as of 12/9/19: 5' 8\" (1.727 m). Weight as of 12/9/19: 181 lb (82.1 kg). Physical Exam   Heart is regular rate and rhythm without gallops or clicks in the lungs are clear to auscultation without wheezes rales or rhonchi. Sinus mucosa shows pallor and edema with clear and clear drainage. No evidence of any injection. There is no palpable maxillary edema noted. Posterior pharynx was clear drainage. TMs are bilaterally intact without serous otitis. Eyes are PERRL and EOMI sclerae white conjunctivae clear. She ambulates really without any difficulty today. Skin warm and dry. She is able to go from a seated to standing position and does not require any assistance and can do it under 30 seconds without any significant difficulty. ASSESSMENT/PLAN:    ICD-10-CM    1. Rheumatoid arthritis, involving unspecified site, unspecified rheumatoid factor presence ([facility] M06.9    2. Lumbar stenosis with neurogenic claudication M48.062 oxyCODONE-acetaminophen (PERCOCET)  MG per tablet   3. Essential hypertension, benign I10    4. Mixed hyperlipidemia E78.2       He has hyperlipidemia for which necessitates the use of the Crestor. Peripheral neuropathy is secondary to the lumbar stenosis. Lyrica worked fairly well but this was not covered by her insurance. We are now titrating the Neurontin for the patient she is initially noticed significant improvement in her neuropathy symptoms. She has rheumatoid arthritis and is under the care of rheumatology. However we prescribe the narcotic medication for pain control with the secondary agreement of rheumatology. No follow-ups on file. Patient requests narcotic medication refill. I have reviewed the current medications and prior notes regarding the need for these refills  I believe the need for refill is warranted at this time.   I have reviewed the OARRS report and have found no suspicion of drug seeking behavior. I have discussed the side effects and narcotic policy with this patient ad the patient has demonstrated understanding. Following the guidelines of the CDC, RASHEL and state medical board of [place], a refill of the narcotic was given today    Titrating the doseslowly the Neurontin to use the lowest dose effectively.   --Arely Welsh,  on 1/7/2020 at 2:40 PM

## 2020-01-07 NOTE — TELEPHONE ENCOUNTER
Last Appointment:  1/7/2020  Future Appointments   Date Time Provider Diego Ceron   2/4/2020  1:30 PM Tashia Ferreira DO [address] [place] [address]      Patient requesting refill on pended medic.ation to be sent to [facility]

## 2020-02-03 RX ORDER — ZOLPIDEM TARTRATE 12.5 MG/1
12.5 TABLET, FILM COATED, EXTENDED RELEASE ORAL NIGHTLY PRN
Qty: 30 TABLET | Refills: 0 | OUTPATIENT
Start: 2020-02-03 | End: 2020-03-04

## 2020-02-03 NOTE — TELEPHONE ENCOUNTER
Name of Medication(s) Requested:  Ambien    Pharmacy Requested:   [id number]    Medication(s) pended? [x] Yes  [] No    Last Appointment:  1/7/2020    Future appts:  Future Appointments   Date Time Provider Diego Ceron   2/4/2020  1:30 PM DO YOLETTE Noe [facility]        Does patient need call back?   [] Yes  [x] No

## 2020-02-04 ENCOUNTER — OFFICE VISIT (OUTPATIENT)
Dept: FAMILY MEDICINE CLINIC | Age: 60
End: 2020-02-04
Payer: COMMERCIAL

## 2020-02-04 VITALS
HEIGHT: 68 IN | WEIGHT: 173 LBS | HEART RATE: 103 BPM | OXYGEN SATURATION: 97 % | RESPIRATION RATE: 18 BRPM | TEMPERATURE: 97.7 F | BODY MASS INDEX: 26.22 KG/M2 | SYSTOLIC BLOOD PRESSURE: 124 MMHG | DIASTOLIC BLOOD PRESSURE: 78 MMHG

## 2020-02-04 PROCEDURE — 99213 OFFICE O/P EST LOW 20 MIN: CPT | Performed by: INTERNAL MEDICINE

## 2020-02-04 RX ORDER — ZOLPIDEM TARTRATE 12.5 MG/1
12.5 TABLET, FILM COATED, EXTENDED RELEASE ORAL NIGHTLY PRN
Qty: 30 TABLET | Refills: 2 | Status: SHIPPED | OUTPATIENT
Start: 2020-02-04 | End: 2020-05-01 | Stop reason: SDUPTHER

## 2020-02-04 RX ORDER — OXYCODONE AND ACETAMINOPHEN 10; 325 MG/1; MG/1
1 TABLET ORAL EVERY 6 HOURS
Qty: 120 TABLET | Refills: 0 | Status: SHIPPED | OUTPATIENT
Start: 2020-02-04 | End: 2020-03-03 | Stop reason: SDUPTHER

## 2020-02-04 RX ORDER — ROSUVASTATIN CALCIUM 40 MG/1
40 TABLET, COATED ORAL DAILY
Qty: 90 TABLET | Refills: 1 | Status: SHIPPED
Start: 2020-02-04 | End: 2020-06-30 | Stop reason: SDUPTHER

## 2020-02-04 RX ORDER — NARATRIPTAN 1 MG/1
1 TABLET ORAL ONCE
Qty: 12 TABLET | Refills: 0 | Status: SHIPPED
Start: 2020-02-04 | End: 2020-03-03 | Stop reason: SDUPTHER

## 2020-02-04 RX ORDER — GABAPENTIN 100 MG/1
CAPSULE ORAL
Qty: 120 CAPSULE | Refills: 2 | Status: SHIPPED | OUTPATIENT
Start: 2020-02-04 | End: 2020-05-20 | Stop reason: SDUPTHER

## 2020-02-04 NOTE — PROGRESS NOTES
2020     Henny Palma (:  1960) is a 64 y.o. female, here for evaluation of the following medical concerns:  She is feeling a grinding sensation again in the left hip. She is already been to see orthopedics once about the knee. I told her she should follow-up with orthopedics again as this is already had a hip arthroplasty performed. She sees Dr Shantal Garcia in 2 weeks. OARRS: 2020   Pain management agreement: 10-15-19  PHQ-9: 18  Urine drug screen: 10-8-2019  Pain assessment questionnaire:  She does not use alcohol or tobacco. She is recently had lumbar discectomy performed by neurosurgery. She had an L3- 4- 5 fusion performed on May 7, 2019. Her pain at its worse is an 9/10. Is also has rheumatoid arthritis with positive rheumatoid factor. Is under the care of rheumatology however they will not treat the chronic pain. Lyrica would not be covered by her insurance company. She was able to start gabapentin and we are slowly titrating this. She has noticed significant improvement with the pain since starting the gabapentin. Review of Systems    Prior to Visit Medications    Medication Sig Taking? Authorizing Provider   rosuvastatin (CRESTOR) 40 MG tablet Take 1 tablet by mouth daily Yes Misty Pryor, DO   gabapentin (NEURONTIN) 100 MG capsule 1 po at hs for 2 weeks then 1 po BID Yes Misty Pryor DO   naratriptan HCl (AMERGE) 1 MG tablet Take 1 tablet by mouth once for 1 dose Yes Misty rPyor DO   zolpidem (AMBIEN CR) 12.5 MG extended release tablet Take 1 tablet by mouth nightly as needed for Sleep for up to 30 days. Yes Misty Pryor, DO   esomeprazole ([address]) 20 MG delayed release capsule Take 1 capsule by mouth nightly Yes Misty Pryor, DO   oxyCODONE-acetaminophen (PERCOCET)  MG per tablet Take 1 tablet by mouth every 6 hours for 30 days.  Yes Misty Pryor, DO   orphenadrine (NORFLEX) 100 MG extended release tablet Take 1 tablet by mouth 2 times daily Yes Miryam Side, DO   cyclobenzaprine (FLEXERIL) 10 MG tablet Take 1 tablet by mouth 2 times daily as needed for Muscle spasms Instructed to take am of procedure if needed Yes Miryam Side, DO   meloxicam (MOBIC) 15 MG tablet Take 1 tablet by mouth daily Yes Miryam Side, DO   levothyroxine (SYNTHROID) 50 MCG tablet Take 1 tablet by mouth Daily Yes Miryam Side, DO   aspirin 81 MG tablet Take 81 mg by mouth daily Pt instructed to check hold with dr. Danise Brunner Yes Historical Provider, MD   B Complex Vitamins (VITAMIN B COMPLEX) TABS Take by mouth 2 times daily Indications: 1 po bif (low B12 on 5/2018 labs) Ld 9/7/2019 Yes Historical Provider, MD   fluticasone (FLONASE) 50 MCG/ACT nasal spray 1 spray by Each Nostril route daily Yes Miryam Side, DO   nabumetone (RELAFEN) 750 MG tablet Take 750 mg by mouth 2 times daily Yes Historical Provider, MD   Cholecalciferol (VITAMIN D) 2000 UNITS CAPS capsule Take 2,000 Units by mouth daily Yes Historical Provider, MD        Social History     Tobacco Use    Smoking status: Never Smoker    Smokeless tobacco: Never Used   Substance Use Topics    Alcohol use: Never     Frequency: Never        Vitals:    02/04/20 1332   BP: 124/78   Pulse: 103   Resp: 18   Temp: 97.7 °F (36.5 °C)   TempSrc: Temporal   SpO2: 97%   Weight: 173 lb (78.5 kg)   Height: 5' 8\" (1.727 m)     Estimated body mass index is 26.3 kg/m² as calculated from the following:    Height as of this encounter: 5' 8\" (1.727 m). Weight as of this encounter: 173 lb (78.5 kg). Physical Exam   Heart is regular rate and rhythm without gallops or clicks in the lungs are clear to auscultation without wheezes rales or rhonchi. Sinus mucosa shows pallor and edema with clear and clear drainage. No evidence of any injection. There is no palpable maxillary edema noted. Posterior pharynx was clear drainage.  TMs are bilaterally intact without serous

## 2020-02-17 RX ORDER — ORPHENADRINE CITRATE 100 MG/1
100 TABLET, EXTENDED RELEASE ORAL 2 TIMES DAILY
Qty: 60 TABLET | Refills: 0 | Status: SHIPPED
Start: 2020-02-17 | End: 2020-03-18 | Stop reason: SDUPTHER

## 2020-02-17 NOTE — TELEPHONE ENCOUNTER
Last Appointment:  2/4/2020  Future Appointments   Date Time Provider Diego Ceron   3/3/2020  1:15 PM [facility], [address]      Patient calling in to request a refill on Norflex.  Order pended, thank you

## 2020-03-03 ENCOUNTER — OFFICE VISIT (OUTPATIENT)
Dept: FAMILY MEDICINE CLINIC | Age: 60
End: 2020-03-03
Payer: COMMERCIAL

## 2020-03-03 VITALS
TEMPERATURE: 97.7 F | OXYGEN SATURATION: 98 % | DIASTOLIC BLOOD PRESSURE: 72 MMHG | SYSTOLIC BLOOD PRESSURE: 124 MMHG | BODY MASS INDEX: 26 KG/M2 | HEART RATE: 114 BPM | WEIGHT: 171 LBS

## 2020-03-03 PROCEDURE — 99213 OFFICE O/P EST LOW 20 MIN: CPT | Performed by: INTERNAL MEDICINE

## 2020-03-03 RX ORDER — NARATRIPTAN 1 MG/1
1 TABLET ORAL ONCE
Qty: 9 TABLET | Refills: 2 | Status: SHIPPED
Start: 2020-03-03 | End: 2020-05-29 | Stop reason: SDUPTHER

## 2020-03-03 RX ORDER — OXYCODONE AND ACETAMINOPHEN 10; 325 MG/1; MG/1
1 TABLET ORAL EVERY 6 HOURS
Qty: 120 TABLET | Refills: 0 | Status: SHIPPED | OUTPATIENT
Start: 2020-03-03 | End: 2020-04-02 | Stop reason: SDUPTHER

## 2020-03-04 ENCOUNTER — TELEPHONE (OUTPATIENT)
Dept: FAMILY MEDICINE CLINIC | Age: 60
End: 2020-03-04

## 2020-03-04 NOTE — TELEPHONE ENCOUNTER
Citlali Wilks is already not working. She has referral to neurology does not need this prior authorized.

## 2020-03-18 RX ORDER — ORPHENADRINE CITRATE 100 MG/1
100 TABLET, EXTENDED RELEASE ORAL 2 TIMES DAILY
Qty: 60 TABLET | Refills: 2 | Status: SHIPPED
Start: 2020-03-18 | End: 2020-06-18 | Stop reason: SDUPTHER

## 2020-03-18 NOTE — TELEPHONE ENCOUNTER
Last Appointment:  3/3/2020  Future Appointments   Date Time Provider Diego Ceron   4/2/2020  2:15 PM Misty Pryor,  [address] calling will you send in orphenadrine?  rx pended

## 2020-04-02 ENCOUNTER — VIRTUAL VISIT (OUTPATIENT)
Dept: PRIMARY CARE CLINIC | Age: 60
End: 2020-04-02
Payer: COMMERCIAL

## 2020-04-02 PROCEDURE — G2012 BRIEF CHECK IN BY MD/QHP: HCPCS | Performed by: INTERNAL MEDICINE

## 2020-04-02 RX ORDER — OXYCODONE AND ACETAMINOPHEN 10; 325 MG/1; MG/1
1 TABLET ORAL EVERY 6 HOURS
Qty: 120 TABLET | Refills: 0 | Status: SHIPPED
Start: 2020-04-02 | End: 2020-05-01 | Stop reason: SDUPTHER

## 2020-05-01 ENCOUNTER — VIRTUAL VISIT (OUTPATIENT)
Dept: PRIMARY CARE CLINIC | Age: 60
End: 2020-05-01
Payer: COMMERCIAL

## 2020-05-01 PROCEDURE — 99213 OFFICE O/P EST LOW 20 MIN: CPT | Performed by: INTERNAL MEDICINE

## 2020-05-01 RX ORDER — ZOLPIDEM TARTRATE 12.5 MG/1
12.5 TABLET, FILM COATED, EXTENDED RELEASE ORAL NIGHTLY PRN
Qty: 30 TABLET | Refills: 2 | Status: SHIPPED
Start: 2020-05-01 | End: 2020-07-23 | Stop reason: SDUPTHER

## 2020-05-01 RX ORDER — OXYCODONE AND ACETAMINOPHEN 10; 325 MG/1; MG/1
1 TABLET ORAL EVERY 6 HOURS
Qty: 120 TABLET | Refills: 0 | Status: SHIPPED
Start: 2020-05-01 | End: 2020-05-29 | Stop reason: SDUPTHER

## 2020-05-01 NOTE — PROGRESS NOTES
TeleMedicine Patient Consent    This visit was performed as a virtual video visit using a synchronous, two-way, audio-video telehealth technology platform. Patient identification was verified at the start of the visit, including the patient's telephone number and physical location. I discussed with the patient the nature of our telehealth visits, that:     1. Due to the nature of an audio- video modality, the only components of a physical exam that could be done are the elements supported by direct observation. 2. I would evaluate the patient and recommend diagnostics and treatments based on my assessment. 3. If it was felt that the patient should be evaluated in clinic or an emergency room setting, then they would be directed there. 4. Our sessions are not being recorded and that personal health information is protected. 5. Our team would provide follow up care in person if/when the patient needs it. Patient does agree to proceed with telemedicine consultation. Patient's location: other address in [address] [place] [address]    Physician  location home address in [place]   Pt is here for her monthly pain meds,               2020     Lis Glass (:  1960) is a 64 y.o. female, seen today for chronic pain management and refill of medications. States that the headaches are under better control. She uses the Ambien on an as-needed basis at night for sleeping. The narcotic medication is pain control for her rheumatoid arthritis. Heath Echeverria OARRS: 2020   Pain management agreement: 10-15-19  PHQ-9: 18  Urine drug screen: 10-8-2019  Pain assessment questionnaire:  She does not use alcohol or tobacco. She is recently had lumbar discectomy performed by neurosurgery. She had an L3- 4- 5 fusion performed on May 7, 2019. Her pain at its worse is an 9/10. Is also has rheumatoid arthritis with positive rheumatoid factor. This is under the care of rheumatology, however they will not treat the chronic pain.

## 2020-05-20 RX ORDER — GABAPENTIN 100 MG/1
CAPSULE ORAL
Qty: 120 CAPSULE | Refills: 2 | Status: SHIPPED
Start: 2020-05-20 | End: 2020-08-25 | Stop reason: SDUPTHER

## 2020-05-20 NOTE — TELEPHONE ENCOUNTER
Last Appointment:  3/3/2020  Future Appointments   Date Time Provider Diego Ceron   6/1/2020  1:00 PM Severiano Grady asking for refill on gabapentin does not have enough til appt  Med pended

## 2020-05-22 ENCOUNTER — TELEPHONE (OUTPATIENT)
Dept: FAMILY MEDICINE CLINIC | Age: 60
End: 2020-05-22

## 2020-05-22 NOTE — TELEPHONE ENCOUNTER
Pt called stating she has a fever, sinus pain and pressure. Green mucous. Advised flu clinic and pt was in agreement.

## 2020-05-26 RX ORDER — CYCLOBENZAPRINE HCL 10 MG
10 TABLET ORAL 2 TIMES DAILY PRN
Qty: 90 TABLET | Refills: 3 | Status: SHIPPED | OUTPATIENT
Start: 2020-05-26

## 2020-05-29 ENCOUNTER — VIRTUAL VISIT (OUTPATIENT)
Dept: FAMILY MEDICINE CLINIC | Age: 60
End: 2020-05-29
Payer: COMMERCIAL

## 2020-05-29 PROCEDURE — 99213 OFFICE O/P EST LOW 20 MIN: CPT | Performed by: INTERNAL MEDICINE

## 2020-05-29 RX ORDER — NARATRIPTAN 1 MG/1
1 TABLET ORAL ONCE
Qty: 9 TABLET | Refills: 2 | Status: SHIPPED
Start: 2020-05-29 | End: 2020-09-21 | Stop reason: SDUPTHER

## 2020-05-29 RX ORDER — OXYCODONE AND ACETAMINOPHEN 10; 325 MG/1; MG/1
1 TABLET ORAL EVERY 6 HOURS
Qty: 120 TABLET | Refills: 0 | Status: SHIPPED
Start: 2020-05-29 | End: 2020-06-30 | Stop reason: SDUPTHER

## 2020-05-29 NOTE — PROGRESS NOTES
Lyrica would not be covered by her insurance company. She was able to start gabapentin and we are slowly titrating this. Review of Systems    Prior to Visit Medications    Medication Sig Taking? Authorizing Provider   oxyCODONE-acetaminophen (PERCOCET)  MG per tablet Take 1 tablet by mouth every 6 hours for 30 days. Yes Yoko Guardado, DO   naratriptan HCl (AMERGE) 1 MG tablet Take 1 tablet by mouth once for 1 dose Yes Yoko Guardado DO   cyclobenzaprine (FLEXERIL) 10 MG tablet Take 1 tablet by mouth 2 times daily as needed for Muscle spasms Instructed to take am of procedure if needed  Yoko Guardado, DO   gabapentin (NEURONTIN) 100 MG capsule 1 po at hs for 2 weeks then 1 po BID  Yoko Guardado DO   zolpidem (AMBIEN CR) 12.5 MG extended release tablet Take 1 tablet by mouth nightly as needed for Sleep for up to 30 days.   Yoko Guardado DO   orphenadrine (NORFLEX) 100 MG extended release tablet Take 1 tablet by mouth 2 times daily  Yoko Guardado DO   rosuvastatin (CRESTOR) 40 MG tablet Take 1 tablet by mouth daily  Yoko Guardado DO   esomeprazole (NEXIUM) 20 MG delayed release capsule Take 1 capsule by mouth nightly  Yoko Guardado DO   meloxicam (MOBIC) 15 MG tablet Take 1 tablet by mouth daily  Yoko Guardado DO   levothyroxine (SYNTHROID) 50 MCG tablet Take 1 tablet by mouth Daily  Yoko Guardado DO   aspirin 81 MG tablet Take 81 mg by mouth daily Pt instructed to check hold with dr. Tien Leblanc Provider, MD   B Complex Vitamins (VITAMIN B COMPLEX) TABS Take by mouth 2 times daily Indications: 1 po bif (low B12 on 5/2018 labs) Ld 9/7/2019  Historical Provider, MD   fluticasone (FLONASE) 50 MCG/ACT nasal spray 1 spray by Each Nostril route daily  Yoko Guardado DO   nabumetone (RELAFEN) 750 MG tablet Take 750 mg by mouth 2 times daily  Historical Provider, MD   Cholecalciferol (VITAMIN D) 2000 UNITS CAPS capsule Take 2,000

## 2020-06-18 RX ORDER — ORPHENADRINE CITRATE 100 MG/1
100 TABLET, EXTENDED RELEASE ORAL 2 TIMES DAILY
Qty: 60 TABLET | Refills: 2 | Status: SHIPPED
Start: 2020-06-18 | End: 2020-09-23 | Stop reason: SDUPTHER

## 2020-06-30 ENCOUNTER — HOSPITAL ENCOUNTER (OUTPATIENT)
Age: 60
Discharge: HOME | End: 2020-07-02
Payer: COMMERCIAL

## 2020-06-30 ENCOUNTER — OFFICE VISIT (OUTPATIENT)
Dept: FAMILY MEDICINE CLINIC | Age: 60
End: 2020-06-30
Payer: COMMERCIAL

## 2020-06-30 VITALS
OXYGEN SATURATION: 98 % | DIASTOLIC BLOOD PRESSURE: 70 MMHG | WEIGHT: 168 LBS | HEART RATE: 89 BPM | TEMPERATURE: 97.8 F | SYSTOLIC BLOOD PRESSURE: 120 MMHG | BODY MASS INDEX: 25.54 KG/M2

## 2020-06-30 LAB
ALBUMIN SERPL-MCNC: 4.7 G/DL (ref 3.5–5.2)
ALP BLD-CCNC: 85 U/L (ref 35–104)
ALT SERPL-CCNC: 6 U/L (ref 0–32)
ANION GAP SERPL CALCULATED.3IONS-SCNC: 15 MMOL/L (ref 7–16)
AST SERPL-CCNC: 17 U/L (ref 0–31)
BASOPHILS ABSOLUTE: 0.05 E9/L (ref 0–0.2)
BASOPHILS RELATIVE PERCENT: 0.8 % (ref 0–2)
BILIRUB SERPL-MCNC: <0.2 MG/DL (ref 0–1.2)
BILIRUBIN DIRECT: <0.2 MG/DL (ref 0–0.3)
BILIRUBIN, INDIRECT: NORMAL MG/DL (ref 0–1)
BUN BLDV-MCNC: 9 MG/DL (ref 6–20)
CALCIUM SERPL-MCNC: 9.5 MG/DL (ref 8.6–10.2)
CHLORIDE BLD-SCNC: 105 MMOL/L (ref 98–107)
CHOLESTEROL, TOTAL: 288 MG/DL (ref 0–199)
CO2: 23 MMOL/L (ref 22–29)
CREAT SERPL-MCNC: 0.7 MG/DL (ref 0.5–1)
EOSINOPHILS ABSOLUTE: 0.13 E9/L (ref 0.05–0.5)
EOSINOPHILS RELATIVE PERCENT: 2.2 % (ref 0–6)
GFR AFRICAN AMERICAN: >60
GFR NON-AFRICAN AMERICAN: >60 ML/MIN/1.73
GLUCOSE BLD-MCNC: 89 MG/DL (ref 74–99)
HCT VFR BLD CALC: 40.8 % (ref 34–48)
HDLC SERPL-MCNC: 38 MG/DL
HEMOGLOBIN: 12.4 G/DL (ref 11.5–15.5)
IMMATURE GRANULOCYTES #: 0.03 E9/L
IMMATURE GRANULOCYTES %: 0.5 % (ref 0–5)
LDL CHOLESTEROL CALCULATED: 175 MG/DL (ref 0–99)
LYMPHOCYTES ABSOLUTE: 1.96 E9/L (ref 1.5–4)
LYMPHOCYTES RELATIVE PERCENT: 33.1 % (ref 20–42)
MCH RBC QN AUTO: 28.1 PG (ref 26–35)
MCHC RBC AUTO-ENTMCNC: 30.4 % (ref 32–34.5)
MCV RBC AUTO: 92.5 FL (ref 80–99.9)
MONOCYTES ABSOLUTE: 0.51 E9/L (ref 0.1–0.95)
MONOCYTES RELATIVE PERCENT: 8.6 % (ref 2–12)
NEUTROPHILS ABSOLUTE: 3.25 E9/L (ref 1.8–7.3)
NEUTROPHILS RELATIVE PERCENT: 54.8 % (ref 43–80)
PDW BLD-RTO: 14.5 FL (ref 11.5–15)
PHOSPHORUS: 4.2 MG/DL (ref 2.5–4.5)
PLATELET # BLD: 260 E9/L (ref 130–450)
PMV BLD AUTO: 11.2 FL (ref 7–12)
POTASSIUM SERPL-SCNC: 4.3 MMOL/L (ref 3.5–5)
RBC # BLD: 4.41 E12/L (ref 3.5–5.5)
SODIUM BLD-SCNC: 143 MMOL/L (ref 132–146)
TOTAL PROTEIN: 7.8 G/DL (ref 6.4–8.3)
TRIGL SERPL-MCNC: 375 MG/DL (ref 0–149)
TSH SERPL DL<=0.05 MIU/L-ACNC: 1.82 UIU/ML (ref 0.27–4.2)
VLDLC SERPL CALC-MCNC: 75 MG/DL
WBC # BLD: 5.9 E9/L (ref 4.5–11.5)

## 2020-06-30 PROCEDURE — 36415 COLL VENOUS BLD VENIPUNCTURE: CPT

## 2020-06-30 PROCEDURE — 99214 OFFICE O/P EST MOD 30 MIN: CPT | Performed by: INTERNAL MEDICINE

## 2020-06-30 PROCEDURE — 82248 BILIRUBIN DIRECT: CPT

## 2020-06-30 PROCEDURE — 84443 ASSAY THYROID STIM HORMONE: CPT

## 2020-06-30 PROCEDURE — 84460 ALANINE AMINO (ALT) (SGPT): CPT

## 2020-06-30 PROCEDURE — 84450 TRANSFERASE (AST) (SGOT): CPT

## 2020-06-30 PROCEDURE — 80069 RENAL FUNCTION PANEL: CPT

## 2020-06-30 PROCEDURE — 84155 ASSAY OF PROTEIN SERUM: CPT

## 2020-06-30 PROCEDURE — 85025 COMPLETE CBC W/AUTO DIFF WBC: CPT

## 2020-06-30 PROCEDURE — 84075 ASSAY ALKALINE PHOSPHATASE: CPT

## 2020-06-30 PROCEDURE — 82247 BILIRUBIN TOTAL: CPT

## 2020-06-30 PROCEDURE — 80061 LIPID PANEL: CPT

## 2020-06-30 RX ORDER — MELOXICAM 15 MG/1
15 TABLET ORAL DAILY
Qty: 90 TABLET | Refills: 1 | Status: SHIPPED | OUTPATIENT
Start: 2020-06-30

## 2020-06-30 RX ORDER — OXYCODONE AND ACETAMINOPHEN 10; 325 MG/1; MG/1
1 TABLET ORAL EVERY 6 HOURS
Qty: 120 TABLET | Refills: 0 | Status: SHIPPED | OUTPATIENT
Start: 2020-06-30 | End: 2020-07-23 | Stop reason: SDUPTHER

## 2020-06-30 RX ORDER — ROSUVASTATIN CALCIUM 40 MG/1
40 TABLET, COATED ORAL DAILY
Qty: 90 TABLET | Refills: 1 | Status: SHIPPED | OUTPATIENT
Start: 2020-06-30

## 2020-06-30 RX ORDER — LEVOTHYROXINE SODIUM 0.05 MG/1
50 TABLET ORAL DAILY
Qty: 90 TABLET | Refills: 1 | Status: SHIPPED | OUTPATIENT
Start: 2020-06-30

## 2020-06-30 NOTE — PROGRESS NOTES
kg).    Physical Exam   Heart is regular rate and rhythm without gallops or clicks. The lungs are clear to auscultation without wheezes rales or rhonchi. Sinus mucosa shows pallor and edema with clear and clear drainage. No evidence of any injection. There is no palpable maxillary edema noted. Posterior pharynx was clear drainage. TMs are bilaterally intact without serous otitis. Eyes are PERRL and EOMI sclerae white conjunctivae clear. She ambulates really without any difficulty today. Skin warm and dry. She is able to go from a seated to standing position and does not require any assistance and can do it under 30 seconds without any significant difficulty. She is able to go from a seated to a standing position and does not require any assistance. ASSESSMENT/PLAN:    ICD-10-CM    1. Right hip pain M25.551 External Referral To Orthopedic Surgery   2. Lumbar stenosis with neurogenic claudication M48.062 oxyCODONE-acetaminophen (PERCOCET)  MG per tablet   3. Rheumatoid arthritis, involving unspecified site, unspecified rheumatoid factor presence ([facility] M06.9       . No follow-ups on file. Patient requests narcotic medication refill. I have reviewed the current medications and prior notes regarding the need for these refills  I believe the need for refill is warranted at this time. I have reviewed the OARRS report and have found no suspicion of drug seeking behavior. I have discussed the side effects and narcotic policy with this patient ad the patient has demonstrated understanding. Following the guidelines of the CDC, RASHEL and state medical board of [place], a refill of the narcotic was given today    Has been referred back to orthopedics for reevaluation of the knee discomfort she has bilaterally and a new problem is the right hip discomfort. If necessary she can be referred to physical medicine.   --Maribel Hutchinson, DO on 6/30/2020 at 2:51 PM

## 2020-07-23 ENCOUNTER — VIRTUAL VISIT (OUTPATIENT)
Dept: FAMILY MEDICINE CLINIC | Age: 60
End: 2020-07-23
Payer: COMMERCIAL

## 2020-07-23 PROCEDURE — 99213 OFFICE O/P EST LOW 20 MIN: CPT | Performed by: INTERNAL MEDICINE

## 2020-07-23 RX ORDER — ZOLPIDEM TARTRATE 12.5 MG/1
12.5 TABLET, FILM COATED, EXTENDED RELEASE ORAL NIGHTLY PRN
Qty: 30 TABLET | Refills: 2 | Status: SHIPPED
Start: 2020-07-28 | End: 2020-10-23 | Stop reason: SDUPTHER

## 2020-07-23 RX ORDER — OXYCODONE AND ACETAMINOPHEN 10; 325 MG/1; MG/1
1 TABLET ORAL EVERY 6 HOURS
Qty: 120 TABLET | Refills: 0 | Status: SHIPPED
Start: 2020-07-28 | End: 2020-08-25 | Stop reason: SDUPTHER

## 2020-07-23 NOTE — PROGRESS NOTES
TeleMedicine Video Visit    This visit was performed as a virtual video visit using a synchronous, two-way, audio-video telehealth technology platform. Patient identification was verified at the start of the visit, including the patient's telephone number and physical location. I discussed with the patient the nature of our telehealth visits, that:     1. Due to the nature of an audio- video modality, the only components of a physical exam that could be done are the elements supported by direct observation. 2. I would evaluate the patient and recommend diagnostics and treatments based on my assessment. 3. If it was felt that the patient should be evaluated in clinic or an emergency room setting, then they would be directed there. 4. Our sessions are not being recorded and that personal health information is protected. 5. Our team would provide follow up care in person if/when the patient needs it. Patient does agree to proceed with telemedicine consultation. Patient's location: home address in [facility]  Physician  location other address in [place], [place]       2020     Naya Diamond (:  1960) is a 64 y.o. female, was seen today requesting refills of her narcotic medicines. She says the medicine takes the edge off the pain. Of course it does not illuminate the rheumatoid arthritis. She had laboratory performed last month that we reviewed. Cholesterol is at 288 and triglycerides 375. She says she is not eating much junk food but this is a significant increase from before. She is attempting to reduce her diet first before any additional medication will be added. Chief Complaint   Patient presents with    Chronic Pain     refills   OARRS: 2020      Review of Systems    Prior to Visit Medications    Medication Sig Taking? Authorizing Provider   zolpidem (AMBIEN CR) 12.5 MG extended release tablet Take 1 tablet by mouth nightly as needed for Sleep for up to 30 days.  Yes Candido Mason Natalia Elizondo, DO   oxyCODONE-acetaminophen (PERCOCET)  MG per tablet Take 1 tablet by mouth every 6 hours for 30 days.  Yes Patria Laguerre, DO   levothyroxine (SYNTHROID) 50 MCG tablet Take 1 tablet by mouth Daily Yes Patria Laguerre DO   meloxicam (MOBIC) 15 MG tablet Take 1 tablet by mouth daily Yes Patria Laguerre DO   rosuvastatin (CRESTOR) 40 MG tablet Take 1 tablet by mouth daily Yes Patria Laguerre DO   orphenadrine (NORFLEX) 100 MG extended release tablet Take 1 tablet by mouth 2 times daily Yes Patria Laguerre DO   naratriptan HCl (AMERGE) 1 MG tablet Take 1 tablet by mouth once for 1 dose Yes Patria Laguerre DO   cyclobenzaprine (FLEXERIL) 10 MG tablet Take 1 tablet by mouth 2 times daily as needed for Muscle spasms Instructed to take am of procedure if needed Yes Patria Laguerre DO   gabapentin (NEURONTIN) 100 MG capsule 1 po at hs for 2 weeks then 1 po BID Yes Patria Laguerre DO   esomeprazole (eventblimp) 20 MG delayed release capsule Take 1 capsule by mouth nightly Yes Patria Laguerre DO   aspirin 81 MG tablet Take 81 mg by mouth daily Pt instructed to check hold with dr. Faye Aguilar Yes Historical Provider, MD   B Complex Vitamins (VITAMIN B COMPLEX) TABS Take by mouth 2 times daily Indications: 1 po bif (low B12 on 5/2018 labs) Ld 9/7/2019 Yes Historical Provider, MD   nabumetone (RELAFEN) 750 MG tablet Take 750 mg by mouth 2 times daily Yes Historical Provider, MD   Cholecalciferol (VITAMIN D) 2000 UNITS CAPS capsule Take 2,000 Units by mouth daily Yes Historical Provider, MD   fluticasone (FLONASE) 50 MCG/ACT nasal spray 1 spray by Each Nostril route daily  Patria Laguerre DO      Allergies   Allergen Reactions    Seasonal     Sulfa Antibiotics Rash       Past Medical History:   Diagnosis Date    Anemia     [facility]thritis, rheumatoid [facility]     Cancer ([facility]     skin    Fibromyalgia     GERD (gastroesophageal reflux disease)     Hypercholesterolemia     Hypothyroidism     Irritable bowel syndrome     Lump of right breast     Seasonal allergies     Toe fracture, left      Past Surgical History:   Procedure Laterality Date    APPENDECTOMY      BACK SURGERY      CHOLECYSTECTOMY      COLON SURGERY      COLONOSCOPY      FOOT FRACTURE SURGERY Left 9/13/2019    LEFT GREAT PHALANX OPEN REDUCTION INTERNAL FIXATION (SYNTHES) performed by Evi Chan DPM at [address]      TOE SURGERY Right     great toe    TONSILLECTOMY      TOTAL HIP ARTHROPLASTY      left      Social History     Tobacco Use    Smoking status: Never Smoker    Smokeless tobacco: Never Used   Substance Use Topics    Alcohol use: Never     Frequency: Never        There were no vitals filed for this visit. Estimated body mass index is 25.54 kg/m² as calculated from the following:    Height as of 2/4/20: 5' 8\" (1.727 m). Weight as of 6/30/20: 168 lb (76.2 kg). Physical Exam  Very limited physical exam due to virtual visit. She ambulates around the room without difficulty. Is alert and oriented x3. Does not appear in any distress. ASSESSMENT/PLAN:  Luciana Singh was seen today for chronic pain. Diagnoses and all orders for this visit:    Insomnia, unspecified type  -     zolpidem (AMBIEN CR) 12.5 MG extended release tablet; Take 1 tablet by mouth nightly as needed for Sleep for up to 30 days. Lumbar stenosis with neurogenic claudication  -     oxyCODONE-acetaminophen (PERCOCET)  MG per tablet; Take 1 tablet by mouth every 6 hours for 30 days. Patient requests narcotic medication refill. I have reviewed the current medications and prior notes regarding the need for these refills  I believe the need for refill is warranted at this time. I have reviewed the OARRS report and have found no suspicion of drug seeking behavior.   I have discussed the side effects and narcotic policy with this patient ad the patient has

## 2020-08-25 ENCOUNTER — VIRTUAL VISIT (OUTPATIENT)
Dept: FAMILY MEDICINE CLINIC | Age: 60
End: 2020-08-25
Payer: COMMERCIAL

## 2020-08-25 PROCEDURE — 99213 OFFICE O/P EST LOW 20 MIN: CPT | Performed by: INTERNAL MEDICINE

## 2020-08-25 RX ORDER — OXYCODONE AND ACETAMINOPHEN 10; 325 MG/1; MG/1
1 TABLET ORAL EVERY 6 HOURS
Qty: 120 TABLET | Refills: 0 | Status: SHIPPED
Start: 2020-08-25 | End: 2020-09-25 | Stop reason: SDUPTHER

## 2020-08-25 RX ORDER — GABAPENTIN 100 MG/1
100 CAPSULE ORAL 4 TIMES DAILY
Qty: 120 CAPSULE | Refills: 2 | Status: SHIPPED
Start: 2020-08-25 | End: 2020-11-24 | Stop reason: SDUPTHER

## 2020-08-25 RX ORDER — CHLORPHENIRAMINE MALEATE 4 MG/1
4 TABLET ORAL EVERY 6 HOURS PRN
Qty: 60 TABLET | Refills: 4 | Status: SHIPPED | OUTPATIENT
Start: 2020-08-25 | End: 2020-09-24

## 2020-08-25 NOTE — PROGRESS NOTES
TeleMedicine Video Visit    This visit was performed as a virtual video visit using a synchronous, two-way, audio-video telehealth technology platform. Patient identification was verified at the start of the visit, including the patient's telephone number and physical location. I discussed with the patient the nature of our telehealth visits, that:     1. Due to the nature of an audio- video modality, the only components of a physical exam that could be done are the elements supported by direct observation. 2. I would evaluate the patient and recommend diagnostics and treatments based on my assessment. 3. If it was felt that the patient should be evaluated in clinic or an emergency room setting, then they would be directed there. 4. Our sessions are not being recorded and that personal health information is protected. 5. Our team would provide follow up care in person if/when the patient needs it. Patient does agree to proceed with telemedicine consultation. Patient's location: home address in [facility]  Physician  location other address in [address] [place] [address]        2020     Ross Estrada (:  1960) is a 64 y.o. female, here for narcotic medication refills. Also needs a refill of her gabapentin. 2 days ago she started with a sore throat and a lot of sinus drainage. Today she has a fever of 100. She denies that her heart is been racing. She is not short of breath. She is not had a severe headache. I told her this still could be COVID-19 but she has been staying at home. Only her  has been going out to work. She denies any chest pains nausea vomiting or diarrhea. Chief Complaint   Patient presents with    Chronic Pain    Sinus Problem    Fever         Review of Systems    Prior to Visit Medications    Medication Sig Taking? Authorizing Provider   gabapentin (NEURONTIN) 100 MG capsule Take 1 capsule by mouth 4 times daily for 30 days.  1 qid Yes Maggy Babin DO oxyCODONE-acetaminophen (PERCOCET)  MG per tablet Take 1 tablet by mouth every 6 hours for 30 days. Yes Natali Rai DO   chlorpheniramine (CHLOR-TRIMETON) 4 MG tablet Take 1 tablet by mouth every 6 hours as needed for Allergies Yes Natali Rai DO   zolpidem (AMBIEN CR) 12.5 MG extended release tablet Take 1 tablet by mouth nightly as needed for Sleep for up to 30 days.  Yes Natali Rai DO   levothyroxine (SYNTHROID) 50 MCG tablet Take 1 tablet by mouth Daily Yes Natali Rai DO   meloxicam (MOBIC) 15 MG tablet Take 1 tablet by mouth daily Yes Natali Rai DO   rosuvastatin (CRESTOR) 40 MG tablet Take 1 tablet by mouth daily Yes Natali Rai DO   orphenadrine (NORFLEX) 100 MG extended release tablet Take 1 tablet by mouth 2 times daily Yes Natali Rai DO   cyclobenzaprine (FLEXERIL) 10 MG tablet Take 1 tablet by mouth 2 times daily as needed for Muscle spasms Instructed to take am of procedure if needed Yes Natali Rai DO   esomeprazole (NEXIUM) 20 MG delayed release capsule Take 1 capsule by mouth nightly Yes Natali Rai DO   aspirin 81 MG tablet Take 81 mg by mouth daily Pt instructed to check hold with dr. Ariana Wiseman Yes Historical Provider, MD   B Complex Vitamins (VITAMIN B COMPLEX) TABS Take by mouth 2 times daily Indications: 1 po bif (low B12 on 5/2018 labs) Ld 9/7/2019 Yes Historical Provider, MD   fluticasone (FLONASE) 50 MCG/ACT nasal spray 1 spray by Each Nostril route daily Yes Natali Rai DO   nabumetone (RELAFEN) 750 MG tablet Take 750 mg by mouth 2 times daily Yes Historical Provider, MD   Cholecalciferol (VITAMIN D) 2000 UNITS CAPS capsule Take 2,000 Units by mouth daily Yes Historical Provider, MD   naratriptan HCl (AMERGE) 1 MG tablet Take 1 tablet by mouth once for 1 dose  Natali Rai DO      Allergies   Allergen Reactions    Seasonal     Sulfa Antibiotics Rash       Past Medical History: Diagnosis Date    Anemia     Arthritis, rheumatoid (HCC)     Cancer (HCC)     skin    Fibromyalgia     GERD (gastroesophageal reflux disease)     Hypercholesterolemia     Hypothyroidism     Irritable bowel syndrome     Lump of right breast     Seasonal allergies     Toe fracture, left      Past Surgical History:   Procedure Laterality Date    APPENDECTOMY      BACK SURGERY      CHOLECYSTECTOMY      COLON SURGERY      COLONOSCOPY      FOOT FRACTURE SURGERY Left 9/13/2019    LEFT GREAT PHALANX OPEN REDUCTION INTERNAL FIXATION (SYNTHES) performed by Yajaira Duenas DPM at [address]      TOE SURGERY Right     great toe    TONSILLECTOMY      TOTAL HIP ARTHROPLASTY      left      Social History     Tobacco Use    Smoking status: Never Smoker    Smokeless tobacco: Never Used   Substance Use Topics    Alcohol use: Never     Frequency: Never        There were no vitals filed for this visit. Estimated body mass index is 25.54 kg/m² as calculated from the following:    Height as of 2/4/20: 5' 8\" (1.727 m). Weight as of 6/30/20: 168 lb (76.2 kg). Physical Exam  She does not appear in any acute distress. Is able to ambulate in the room without any difficulty. Is no audible wheezing noted. Very limited exam due to video exam.  ASSESSMENT/PLAN:  Laureen Partida was seen today for chronic pain, sinus problem and fever. Diagnoses and all orders for this visit:    Rheumatoid arthritis, involving unspecified site, unspecified rheumatoid factor presence (HCC)    Lumbar stenosis with neurogenic claudication  -     oxyCODONE-acetaminophen (PERCOCET)  MG per tablet; Take 1 tablet by mouth every 6 hours for 30 days. Viral URI    Other orders  -     gabapentin (NEURONTIN) 100 MG capsule; Take 1 capsule by mouth 4 times daily for 30 days. 1 qid  -     chlorpheniramine (CHLOR-TRIMETON) 4 MG tablet;  Take 1 tablet by mouth every 6 hours as needed for Allergies       We will see in 1 month for refill on narcotic medicines. For her viral symptoms have given her chlorpheniramine 4 mg q. 6 as needed. She may also try some Sudafed 10 mg as needed to help with the sinus drying. Patient requests narcotic medication refill. I have reviewed the current medications and prior notes regarding the need for these refills  I believe the need for refill is warranted at this time. I have reviewed the OARRS report and have found no suspicion of drug seeking behavior. I have discussed the side effects and narcotic policy with this patient ad the patient has demonstrated understanding. Following the guidelines of the CDC, RASHEL and [facility] for a higher requirements refill of the narcotic was given today  A follow up appointment will be scheduled with me. An electronic signature was used to authenticate this note. --Hadley Miller, DO on 8/25/2020 at 10:18 AM      Time spent: Greater than Not billed by time    This visit was completed virtually using Doxy. me

## 2020-09-21 RX ORDER — NARATRIPTAN 1 MG/1
TABLET ORAL
Qty: 30 TABLET | Refills: 2 | Status: SHIPPED
Start: 2020-09-21 | End: 2020-11-18

## 2020-09-21 NOTE — TELEPHONE ENCOUNTER
Last Appointment:  8/25/2020  Future Appointments   Date Time Provider Diego Ceron   9/25/2020  9:30 AM Malissa Blair  [address]

## 2020-09-23 RX ORDER — ORPHENADRINE CITRATE 100 MG/1
100 TABLET, EXTENDED RELEASE ORAL 2 TIMES DAILY
Qty: 60 TABLET | Refills: 0 | Status: SHIPPED
Start: 2020-09-23 | End: 2020-10-20 | Stop reason: SDUPTHER

## 2020-09-23 NOTE — TELEPHONE ENCOUNTER
Name of Medication(s) Requested:  3D [employer]s    Pharmacy Requested:   [facility]    Medication(s) pended? [x] Yes  [] No    Last Appointment:  8/25/2020    Future appts:  Future Appointments   Date Time Provider Diego Ceron   9/25/2020  9:30 AM Glenice Prader, DO [facility]        Does patient need call back?   [] Yes  [x] No

## 2020-09-25 ENCOUNTER — VIRTUAL VISIT (OUTPATIENT)
Dept: FAMILY MEDICINE CLINIC | Age: 60
End: 2020-09-25
Payer: COMMERCIAL

## 2020-09-25 PROCEDURE — 99213 OFFICE O/P EST LOW 20 MIN: CPT | Performed by: INTERNAL MEDICINE

## 2020-09-25 RX ORDER — OXYCODONE AND ACETAMINOPHEN 10; 325 MG/1; MG/1
1 TABLET ORAL EVERY 6 HOURS
Qty: 120 TABLET | Refills: 0 | Status: SHIPPED
Start: 2020-09-25 | End: 2020-10-23 | Stop reason: SDUPTHER

## 2020-09-25 NOTE — PROGRESS NOTES
TeleMedicine Video Visit    This visit was performed as a virtual video visit using a synchronous, two-way, audio-video telehealth technology platform. Patient identification was verified at the start of the visit, including the patient's telephone number and physical location. I discussed with the patient the nature of our telehealth visits, that:     1. Due to the nature of an audio- video modality, the only components of a physical exam that could be done are the elements supported by direct observation. 2. I would evaluate the patient and recommend diagnostics and treatments based on my assessment. 3. If it was felt that the patient should be evaluated in clinic or an emergency room setting, then they would be directed there. 4. Our sessions are not being recorded and that personal health information is protected. 5. Our team would provide follow up care in person if/when the patient needs it. Patient does agree to proceed with telemedicine consultation. Patient's location: home address in [facility]  Physician  location other address in [facility] other people involved in kris  Patient is here for check up and refills. Left hip was replaced 16 years and just now started giving the patient. Neurology referral patient has appointment with  . Pt is here for her monthly pain meds,               2020     Naya Diamond (:  1960) is a 64 y.o. female, here for evaluation of the following medical concerns:  She is having increasing problems with her migraine cephalgia. We tried the [address] which apparently is not helping much. Made referral to neurology and she sees him next month. Laboratories are orders were already placed in case she saw him earlier. She is now having pain in the right hip in the area where she had a previous right hip arthroplasty performed.   She has questions about possible revision of the hip if necessary which I told her would be performed at a tertiary care [place]. OARRS: 9-   Pain management agreement: 10-15-19  PHQ-9: 7-20-18  Urine drug screen: 10-8-2019  Pain assessment questionnaire:  She does not use alcohol or tobacco. She is recently had lumbar discectomy performed by neurosurgery. She had an L3- 4- 5 fusion performed on May 7, 2019. Her pain at its worse is an 9/10. Is also has rheumatoid arthritis with positive rheumatoid factor. Is under the care of rheumatology however they will not treat the chronic pain. She had a flu shot on Tuesday at the pharmacy. Wednesday she started with a low-grade fever and some diarrhea. She still having some effects from this and is wondering if this was coincidence or from a reaction from the flu shot. There is not been any nausea or vomiting. Review of Systems    Prior to Visit Medications    Medication Sig Taking? Authorizing Provider   oxyCODONE-acetaminophen (PERCOCET)  MG per tablet Take 1 tablet by mouth every 6 hours for 30 days. Yes Bill Mejias DO   orphenadrine (NORFLEX) 100 MG extended release tablet Take 1 tablet by mouth 2 times daily Yes Bill Mejias DO   naratriptan HCl (AMERGE) 1 MG tablet 1 pill every 12 hours as needed for migraine Yes Bill Mejias DO   gabapentin (NEURONTIN) 100 MG capsule Take 1 capsule by mouth 4 times daily for 30 days. 1 qid Yes Bill Mejias DO   zolpidem (AMBIEN CR) 12.5 MG extended release tablet Take 1 tablet by mouth nightly as needed for Sleep for up to 30 days.  Yes Bill Mejias DO   levothyroxine (SYNTHROID) 50 MCG tablet Take 1 tablet by mouth Daily Yes Bill Mejias DO   meloxicam (MOBIC) 15 MG tablet Take 1 tablet by mouth daily Yes Bill Mejias DO   rosuvastatin (CRESTOR) 40 MG tablet Take 1 tablet by mouth daily Yes Bill Mejias DO   cyclobenzaprine (FLEXERIL) 10 MG tablet Take 1 tablet by mouth 2 times daily as needed for Muscle spasms Instructed to take am of procedure if needed Yes Paul Barnes,    esomeprazole (NEXIUM) 20 MG delayed release capsule Take 1 capsule by mouth nightly Yes Paul Barnes DO   aspirin 81 MG tablet Take 81 mg by mouth daily Pt instructed to check hold with dr. Maryann Tavares Yes Historical Provider, MD   B Complex Vitamins (VITAMIN B COMPLEX) TABS Take by mouth 2 times daily Indications: 1 po bif (low B12 on 5/2018 labs) Ld 9/7/2019 Yes Historical Provider, MD   fluticasone (FLONASE) 50 MCG/ACT nasal spray 1 spray by Each Nostril route daily Yes Paul Barnes DO   nabumetone (RELAFEN) 750 MG tablet Take 750 mg by mouth 2 times daily Yes Historical Provider, MD   Cholecalciferol (VITAMIN D) 2000 UNITS CAPS capsule Take 2,000 Units by mouth daily Yes Historical Provider, MD        Social History     Tobacco Use    Smoking status: Never Smoker    Smokeless tobacco: Never Used   Substance Use Topics    Alcohol use: Never     Frequency: Never        There were no vitals filed for this visit. Estimated body mass index is 25.54 kg/m² as calculated from the following:    Height as of 2/4/20: 5' 8\" (1.727 m). Weight as of 6/30/20: 168 lb (76.2 kg). Physical Exam   Very limited physical exam a virtual visit. She is able to go from a seated to standing position and does not require any assistance and can do it under 30 seconds without any significant difficulty. She is able to go from a seated to a standing position and does not require any assistance. ASSESSMENT/PLAN:    ICD-10-CM    1. Lumbar stenosis with neurogenic claudication  M48.062 oxyCODONE-acetaminophen (PERCOCET)  MG per tablet      . Patient requests narcotic medication refill. I have reviewed the current medications and prior notes regarding the need for these refills  I believe the need for refill is warranted at this time. I have reviewed the OARRS report and have found no suspicion of drug seeking behavior.   I have discussed the side effects and narcotic policy with

## 2020-09-25 NOTE — PROGRESS NOTES
rhythm without gallops or clicks. The lungs are clear to auscultation without wheezes rales or rhonchi. Sinus mucosa shows pallor and edema with clear and clear drainage. No evidence of any injection. There is no palpable maxillary edema noted. Posterior pharynx was clear drainage. TMs are bilaterally intact without serous otitis. Eyes are PERRL and EOMI sclerae white conjunctivae clear. She ambulates really without any difficulty today. Skin warm and dry. She is able to go from a seated to standing position and does not require any assistance and can do it under 30 seconds without any significant difficulty. She is able to go from a seated to a standing position and does not require any assistance. ASSESSMENT/PLAN:    ICD-10-CM    1. Lumbar stenosis with neurogenic claudication  M48.062 oxyCODONE-acetaminophen (PERCOCET)  MG per tablet      . No follow-ups on file. Patient requests narcotic medication refill. I have reviewed the current medications and prior notes regarding the need for these refills  I believe the need for refill is warranted at this time. I have reviewed the OARRS report and have found no suspicion of drug seeking behavior. I have discussed the side effects and narcotic policy with this patient ad the patient has demonstrated understanding. Following the guidelines of the CDC, RASHEL and state medical board of [place], a refill of the narcotic was given today    Has been referred back to orthopedics for reevaluation of the knee discomfort she has bilaterally and a new problem is the right hip discomfort. If necessary she can be referred to physical medicine.   --Malissa Blair DO on 9/25/2020 at 9:16 AM

## 2020-10-20 ENCOUNTER — TELEPHONE (OUTPATIENT)
Dept: ADMINISTRATIVE | Age: 60
End: 2020-10-20

## 2020-10-20 RX ORDER — ORPHENADRINE CITRATE 100 MG/1
100 TABLET, EXTENDED RELEASE ORAL 2 TIMES DAILY
Qty: 60 TABLET | Refills: 0 | Status: SHIPPED
Start: 2020-10-20 | End: 2020-11-24 | Stop reason: SDUPTHER

## 2020-10-20 NOTE — TELEPHONE ENCOUNTER
PT is scheduled for face/face appt with Dr. Daja Booker this Friday for medication refills. Pt is ill with a low grade fever and sore throat. Pt would like to change appointment to virtual if permitted. Please advise pt.     Thanks

## 2020-10-20 NOTE — TELEPHONE ENCOUNTER
Last Appointment:  9/25/2020  Future Appointments   Date Time Provider Diego Ceron   10/23/2020 10:00 AM Severiano Ysabel left message asking for refill on phenergan 100mg 1 bid daily.  This is not on her list. Please advise I did not pend, I could not find this dose

## 2020-10-23 ENCOUNTER — VIRTUAL VISIT (OUTPATIENT)
Dept: FAMILY MEDICINE CLINIC | Age: 60
End: 2020-10-23
Payer: COMMERCIAL

## 2020-10-23 PROCEDURE — 99213 OFFICE O/P EST LOW 20 MIN: CPT | Performed by: INTERNAL MEDICINE

## 2020-10-23 RX ORDER — ZOLPIDEM TARTRATE 12.5 MG/1
12.5 TABLET, FILM COATED, EXTENDED RELEASE ORAL NIGHTLY PRN
Qty: 30 TABLET | Refills: 2 | Status: SHIPPED | OUTPATIENT
Start: 2020-10-23 | End: 2020-11-24

## 2020-10-23 RX ORDER — OXYCODONE AND ACETAMINOPHEN 10; 325 MG/1; MG/1
1 TABLET ORAL EVERY 6 HOURS
Qty: 120 TABLET | Refills: 0 | Status: SHIPPED
Start: 2020-10-23 | End: 2020-11-24 | Stop reason: SDUPTHER

## 2020-10-23 RX ORDER — AZELASTINE 1 MG/ML
1 SPRAY, METERED NASAL 3 TIMES DAILY PRN
Qty: 2 BOTTLE | Refills: 1 | Status: SHIPPED | OUTPATIENT
Start: 2020-10-23

## 2020-10-23 NOTE — PROGRESS NOTES
Take 1 tablet by mouth daily Yes [place]i [place], DO   cyclobenzaprine (FLEXERIL) 10 MG tablet Take 1 tablet by mouth 2 times daily as needed for Muscle spasms Instructed to take am of procedure if needed Yes [place]i [place], DO   esomeprazole ([address]) 20 MG delayed release capsule Take 1 capsule by mouth nightly Yes Simi [place], DO   aspirin 81 MG tablet Take 81 mg by mouth daily Pt instructed to check hold with dr. Lizbeth Mckeon Yes Historical Provider, MD   B Complex Vitamins (VITAMIN B COMPLEX) TABS Take by mouth 2 times daily Indications: 1 po bif (low B12 on 5/2018 labs) Ld 9/7/2019 Yes Historical Provider, MD   fluticasone (FLONASE) 50 MCG/ACT nasal spray 1 spray by Each Nostril route daily Yes Simi [place], DO   nabumetone (RELAFEN) 750 MG tablet Take 750 mg by mouth 2 times daily Yes Historical Provider, MD   Cholecalciferol (VITAMIN D) 2000 UNITS CAPS capsule Take 2,000 Units by mouth daily Yes Historical Provider, MD        Social History     Tobacco Use    Smoking status: Never Smoker    Smokeless tobacco: Never Used   Substance Use Topics    Alcohol use: Never     Frequency: Never        There were no vitals filed for this visit. Estimated body mass index is 25.54 kg/m² as calculated from the following:    Height as of 2/4/20: 5' 8\" (1.727 m). Weight as of 6/30/20: 168 lb (76.2 kg). Physical Exam   Very limited physical exam a virtual visit. She is able to go from a seated to standing position and does not require any assistance and can do it under 30 seconds without any significant difficulty. Does have a lot of sinus drainage and congestion. She is able to go from a seated to a standing position and does not require any assistance. ASSESSMENT/PLAN:    ICD-10-CM    1. Allergic rhinitis due to pollen, unspecified seasonality  J30.1    2. Insomnia, unspecified type  G47.00 zolpidem (AMBIEN CR) 12.5 MG extended release tablet   3.  Lumbar stenosis with neurogenic claudication  M48.062 oxyCODONE-acetaminophen (PERCOCET)  MG per tablet      . Patient requests narcotic medication refill. I have reviewed the current medications and prior notes regarding the need for these refills  I believe the need for refill is warranted at this time. I have reviewed the OARRS report and have found no suspicion of drug seeking behavior. I have discussed the side effects and narcotic policy with this patient ad the patient has demonstrated understanding. Following the guidelines of the CDC, RASHEL and state medical board of [place], a refill of the narcotic was given today. Given her Astelin nasal spray also to use with the chlorpheniramine for her sinus drainage symptoms. --Ashley Graham DO on 10/23/2020 at 11:03 AM      Time spent: Greater than Not billed by time    This visit was completed virtually using Doxy. me              Time spent: Greater than Not billed by time    This visit was completed virtually using Doxy. me

## 2020-11-18 ENCOUNTER — OFFICE VISIT (OUTPATIENT)
Dept: NEUROLOGY | Age: 60
End: 2020-11-18
Payer: COMMERCIAL

## 2020-11-18 VITALS
TEMPERATURE: 97.7 F | BODY MASS INDEX: 25.46 KG/M2 | RESPIRATION RATE: 16 BRPM | DIASTOLIC BLOOD PRESSURE: 95 MMHG | HEIGHT: 68 IN | SYSTOLIC BLOOD PRESSURE: 149 MMHG | HEART RATE: 100 BPM | OXYGEN SATURATION: 100 % | WEIGHT: 168 LBS

## 2020-11-18 PROCEDURE — 99244 OFF/OP CNSLTJ NEW/EST MOD 40: CPT | Performed by: PSYCHIATRY & NEUROLOGY

## 2020-11-18 RX ORDER — NARATRIPTAN 2.5 MG/1
2.5 TABLET ORAL PRN
Qty: 9 TABLET | Refills: 0 | Status: SHIPPED
Start: 2020-11-18 | End: 2020-11-25 | Stop reason: SDUPTHER

## 2020-11-18 RX ORDER — HYDROXYCHLOROQUINE SULFATE 200 MG/1
TABLET, FILM COATED ORAL 2 TIMES DAILY
COMMUNITY

## 2020-11-18 RX ORDER — TOPIRAMATE 25 MG/1
TABLET ORAL
Qty: 74 TABLET | Refills: 0 | Status: SHIPPED
Start: 2020-11-18 | End: 2020-11-24 | Stop reason: SINTOL

## 2020-11-18 ASSESSMENT — ENCOUNTER SYMPTOMS
PHOTOPHOBIA: 0
VOMITING: 0
TROUBLE SWALLOWING: 0
SHORTNESS OF BREATH: 0
NAUSEA: 0

## 2020-11-18 NOTE — PROGRESS NOTES
NEUROLOGY NEW PATIENT NOTE     Date: 11/18/2020  Name: Neptali Rehman  MRN: [id number]  Patient's PCP: Seth Hickey DO     Dear, Dr. Seth Hickey DO    REASON FOR VISIT/CHIEF COMPLAINT: Migraine headaches     HISTORY OF PRESENT ILLNESS: Neptali Rehman is a 64 y.o.  female past medical history of rheumatoid arthritis, fibromyalgia, migraine headaches is coming into establish care.  Onset: In her 30s   Duration: 4 hours to 36 hours   Frequency : 3 times a week   Time of occurrence: Any time   Severity on a scale of 1-10: 10/10    Headache Location: Right frontotemporal region radiating all over the head, intermittently in bilateral occipital region.     Change sides: Yes    Character:pressure, pulsating, shooting, stabbing and tight band    Activities that worsens headache: none    Reliving factors: rest and sleep    Headache Triggers or Provoking Factors:   nothing in particular    Headache disability during or after an attack:   severe decrease in function    Associated symptoms:  nausea, vomiting, photophobia and sonophobia    Aura (Visual/Sensory):  visual scintillations    Premonitory Symptoms:  none       Prior Headache Treatments:    Preventatives:   none  Abortives:   Amerge: partial help    Total number of migraine headache days in a month:16    I have personally reviewed her medical records     PAST MEDICAL HISTORY:   Past Medical History:   Diagnosis Date    Anemia     Arthritis, rheumatoid (HCC)     Cancer (HCC)     skin    Fibromyalgia     GERD (gastroesophageal reflux disease)     Hypercholesterolemia     Hypothyroidism     Irritable bowel syndrome     Lump of right breast     Seasonal allergies     Toe fracture, left      PAST SURGICAL HISTORY:   Past Surgical History:   Procedure Laterality Date    APPENDECTOMY      BACK SURGERY      CHOLECYSTECTOMY      COLON SURGERY      COLONOSCOPY      FOOT FRACTURE SURGERY Left 9/13/2019    LEFT GREAT PHALANX OPEN REDUCTION INTERNAL FIXATION (SYNTHES) performed by Glenna Cerna DPM at [address]      TOE SURGERY Right     great toe    TONSILLECTOMY      TOTAL HIP ARTHROPLASTY      left     FAMILY MEDICAL HISTORY:   Family History   Problem Relation Age of Onset    High Blood Pressure Mother     Cancer Mother 68        colon   Manuel Loser Migraines Mother     High Blood Pressure Father     Heart Disease Father     High Blood Pressure Brother     Diabetes Brother     Cancer Maternal Cousin         breast cancer    Cancer Paternal Cousin         breast cancer    Migraines Maternal Grandmother      SOCIAL HISTORY:   Social History     Socioeconomic History    Marital status:      Spouse name: None    Number of children: None    Years of education: None    Highest education level: None   Occupational History    None   Social Needs    Financial resource strain: None    Food insecurity     Worry: None     Inability: None    Transportation needs     Medical: None     Non-medical: None   Tobacco Use    Smoking status: Never Smoker    Smokeless tobacco: Never Used   Substance and Sexual Activity    Alcohol use: Never     Frequency: Never    Drug use: Never    Sexual activity: None   Lifestyle    Physical activity     Days per week: None     Minutes per session: None    Stress: None   Relationships    Social connections     Talks on phone: None     Gets together: None     Attends Zoroastrian service: None     Active member of club or organization: None     Attends meetings of clubs or organizations: None     Relationship status: None    Intimate partner violence     Fear of current or ex partner: None     Emotionally abused: None     Physically abused: None     Forced sexual activity: None   Other Topics Concern    None   Social History Narrative    None      E-Cigarettes Vaping or Juuling     Questions Responses    Vaping Use Never User    Start Date     Does device contain nicotine? Quit Date     Vaping Type          Allergy:   Allergies   Allergen Reactions    Seasonal     Sulfa Antibiotics Rash     MEDS:   Current Outpatient Medications:     hydroxychloroquine (PLAQUENIL) 200 MG tablet, Take by mouth 2 times daily, Disp: , Rfl:     naratriptan (AMERGE) 2.5 MG tablet, Take 1 tablet by mouth as needed for Migraine 2.5 mg at onset of headache, may repeat in 4 hours if needed, Disp: 9 tablet, Rfl: 0    topiramate (TOPAMAX) 25 MG tablet, Take 1 tablet by mouth nightly for 14 days, THEN 2 tablets nightly., Disp: 74 tablet, Rfl: 0    zolpidem (AMBIEN CR) 12.5 MG extended release tablet, Take 1 tablet by mouth nightly as needed for Sleep for up to 30 days. , Disp: 30 tablet, Rfl: 2    oxyCODONE-acetaminophen (PERCOCET)  MG per tablet, Take 1 tablet by mouth every 6 hours for 30 days. , Disp: 120 tablet, Rfl: 0    azelastine (ASTELIN) 0.1 % nasal spray, 1 spray by Nasal route 3 times daily as needed for Rhinitis Use in each nostril as directed, Disp: 2 Bottle, Rfl: 1    levothyroxine (SYNTHROID) 50 MCG tablet, Take 1 tablet by mouth Daily, Disp: 90 tablet, Rfl: 1    rosuvastatin (CRESTOR) 40 MG tablet, Take 1 tablet by mouth daily, Disp: 90 tablet, Rfl: 1    cyclobenzaprine (FLEXERIL) 10 MG tablet, Take 1 tablet by mouth 2 times daily as needed for Muscle spasms Instructed to take am of procedure if needed, Disp: 90 tablet, Rfl: 3    esomeprazole (NEXIUM) 20 MG delayed release capsule, Take 1 capsule by mouth nightly, Disp: 30 capsule, Rfl: 5    aspirin 81 MG tablet, Take 81 mg by mouth daily Pt instructed to check hold with dr. fox, Disp: , Rfl:     B Complex Vitamins (VITAMIN B COMPLEX) TABS, Take by mouth 2 times daily Indications: 1 po bif (low B12 on 5/2018 labs) Ld 9/7/2019, Disp: , Rfl:     fluticasone (FLONASE) 50 MCG/ACT nasal spray, 1 spray by Each Nostril route daily, Disp: 1 Bottle, Rfl: 2    nabumetone (RELAFEN) 750 MG tablet, Take 750 mg by mouth 2 times daily, Disp: , Rfl:     Cholecalciferol (VITAMIN D) 2000 UNITS CAPS capsule, Take 2,000 Units by mouth daily, Disp: , Rfl:     orphenadrine (NORFLEX) 100 MG extended release tablet, Take 1 tablet by mouth 2 times daily (Patient not taking: Reported on 11/18/2020), Disp: 60 tablet, Rfl: 0    gabapentin (NEURONTIN) 100 MG capsule, Take 1 capsule by mouth 4 times daily for 30 days. 1 qid, Disp: 120 capsule, Rfl: 2    meloxicam (MOBIC) 15 MG tablet, Take 1 tablet by mouth daily (Patient not taking: Reported on 11/18/2020), Disp: 90 tablet, Rfl: 1    REVIEW OF SYSTEMS  Review of Systems   Constitutional: Negative for appetite change, fatigue and unexpected weight change. HENT: Negative for drooling, hearing loss, tinnitus and trouble swallowing. Eyes: Negative for photophobia and visual disturbance. Respiratory: Negative for shortness of breath. Cardiovascular: Negative for palpitations. Gastrointestinal: Negative for nausea and vomiting. Endocrine: Negative for polyuria. Genitourinary: Negative for flank pain. Musculoskeletal: Negative for neck pain and neck stiffness. Skin: Negative for rash. Allergic/Immunologic: Negative for food allergies. Neurological: Positive for headaches. Negative for dizziness, tremors, seizures, syncope, speech difficulty, weakness, light-headedness and numbness. Hematological: Negative for adenopathy. Psychiatric/Behavioral: Negative for agitation, behavioral problems and sleep disturbance. PHYSICAL EXAM:   BP (!) 149/95   Pulse 100   Temp 97.7 °F (36.5 °C) (Temporal)   Resp 16   Ht 5' 8\" (1.727 m)   Wt 168 lb (76.2 kg)   SpO2 100%   BMI 25.54 kg/m²   GENERAL APPEARANCE: Alert, well-developed, well-nourished female in no acute distress. HEENT: Normocephalic and atraumatic. PERRL. Oropharynx unremarkable. PULM: Normal respiratory effort. No accessory muscle use. CV: RRR. ABDOMEN: Soft, nontender.    EXTREMITIES: No obvious signs of vascular compromise. Pulses present. No cyanosis, clubbing or edema. SKIN: Clear; no rashes, lesions or skin breaks in exposed areas. NEURO:     Neurological examination     MENTAL STATUS: Patient awake and oriented to time, place, and person. Recent/remote memory normal. Attention span/concentration normal. Speech fluent. Good comprehension, naming, and repetition. Fund of knowledge appropriate for patient's level of education. Affect normal.    CRANIAL NERVES:  CN I: Not tested. CN II: Fundoscopic exam not performed. CN III, IV, VI: Pupils equal, round and reactive to light; extra ocular movements full and intact. CN V: Facial sensation normal.  CN VII: No facial asymmetry. CN VIII:  Hearing grossly normal bilaterally. No pathologic nystagmus or skew deviation. CN IX, X: Palate elevates symmetrically. CN XI: Shoulder shrug and chin rotation equal with intact strength. CN XII: Tongue protrusion midline. MOTOR: Normal bulk. Tone normal and symmetric throughout. Strength 5/5 throughout. ABNORMAL MOVEMENTS/TREMORS: No     REFLEXES: DTRs 2+; normal and symmetric throughout. Plantar response downgoing. SENSATION: Sensation grossly intact to fine touch, pain/temperature, vibration and position. COORDINATION: Finger-to-nose and heel to shin normal for age and symmetric. Finger tapping and alternating movements normal.    STATION: Negative Romberg. GAIT:  Normal heel, toe and tandem; no ataxia.      DIAGNOSTIC TESTS:     I have personally reviewed the most recent lab results:    Sodium   Date Value Ref Range Status   07/24/2020 145 135 - 145 mEq/L Final   07/23/2020 140 135 - 145 mEq/L Final   06/30/2020 143 132 - 146 mmol/L Final     Potassium   Date Value Ref Range Status   07/24/2020 3.7 3.6 - 5.0 mEq/L Final   07/23/2020 3.3 (L) 3.6 - 5.0 mEq/L Final   06/30/2020 4.3 3.5 - 5.0 mmol/L Final     Chloride   Date Value Ref Range Status   07/24/2020 114 (H) 98 - 107 mEq/L Final   07/23/2020 107 98 - 107 mEq/L Final   06/30/2020 105 98 - 107 mmol/L Final     CO2   Date Value Ref Range Status   07/24/2020 27 21 - 31 mEq/L Final   07/23/2020 26 21 - 31 mEq/L Final   06/30/2020 23 22 - 29 mmol/L Final     BUN   Date Value Ref Range Status   07/24/2020 9 8 - 21 mg/dL Final   07/23/2020 11 8 - 21 mg/dL Final   06/30/2020 9 6 - 20 mg/dL Final     CREATININE   Date Value Ref Range Status   07/24/2020 0.6 0.4 - 1.0 mg/dL Final   07/23/2020 0.7 0.4 - 1.0 mg/dL Final   06/30/2020 0.7 0.5 - 1.0 mg/dL Final     GFR Non-   Date Value Ref Range Status   07/24/2020 103 >60 mL/min Final   07/23/2020 86 >60 mL/min Final   06/30/2020 >60 >=60 mL/min/1.73 Final     Comment:     Chronic Kidney Disease: less than 60 ml/min/1.73 sq.m. Kidney Failure: less than 15 ml/min/1.73 sq.m. Results valid for patients 18 years and older.        Calcium   Date Value Ref Range Status   07/24/2020 8.8 8.5 - 10.5 mg/dL Final   07/23/2020 9.3 8.5 - 10.5 mg/dL Final   06/30/2020 9.5 8.6 - 10.2 mg/dL Final     Phosphorus   Date Value Ref Range Status   06/30/2020 4.2 2.5 - 4.5 mg/dL Final   09/06/2019 3.6 2.5 - 4.5 mg/dL Final     Magnesium   Date Value Ref Range Status   07/24/2020 1.5 (L) 1.6 - 2.6 mEq/L Final     WBC   Date Value Ref Range Status   07/24/2020 5.4 4.5 - 11.0 K/uL Final   07/23/2020 7.1 4.5 - 11.0 K/uL Final   06/30/2020 5.9 4.5 - 11.5 E9/L Final     Hemoglobin   Date Value Ref Range Status   07/24/2020 10.6 (L) 12.0 - 15.0 g/dL Final   07/23/2020 11.9 (L) 12.0 - 15.0 g/dL Final   06/30/2020 12.4 11.5 - 15.5 g/dL Final     Hematocrit   Date Value Ref Range Status   07/24/2020 31.4 (L) 36.0 - 44.0 % Final   07/23/2020 35.5 (L) 36.0 - 44.0 % Final   06/30/2020 40.8 34.0 - 48.0 % Final     Platelets   Date Value Ref Range Status   07/24/2020 226 150 - 450 K/uL Final   07/23/2020 276 150 - 450 K/uL Final   06/30/2020 260 130 - 450 E9/L Final     Neutrophils %   Date Value Ref Range Status   06/30/2020 54.8 43.0 - 80.0 % Final   10/08/2019 58.1 % Final   09/06/2019 52.5 43.0 - 80.0 % Final     Monocytes %   Date Value Ref Range Status   07/24/2020 8.1 3.4 - 9.0 % Final   07/23/2020 8.3 3.4 - 9.0 % Final   06/30/2020 8.6 2.0 - 12.0 % Final     Eosinophils %   Date Value Ref Range Status   10/08/2019 3.6 % Final     Total Protein   Date Value Ref Range Status   07/23/2020 8.2 (H) 5.9 - 7.8 g/dL Final   06/30/2020 7.8 6.4 - 8.3 g/dL Final   09/06/2019 7.6 6.4 - 8.3 g/dL Final     Total Bilirubin   Date Value Ref Range Status   07/23/2020 0.5 0.3 - 1.5 mg/dL Final   06/30/2020 <0.2 0.0 - 1.2 mg/dL Final   10/08/2019 0.4 0.1 - 1.4 mg/dL Final     Alkaline Phosphatase   Date Value Ref Range Status   07/23/2020 73 42 - 121 U/L Final   06/30/2020 85 35 - 104 U/L Final   10/08/2019 74 U/L Final     ALT   Date Value Ref Range Status   07/23/2020 11 10 - 54 U/L Final   06/30/2020 6 0 - 32 U/L Final   10/08/2019 12 U/L Final     AST   Date Value Ref Range Status   07/23/2020 19 10 - 41 U/L Final   06/30/2020 17 0 - 31 U/L Final   10/08/2019 22 U/L Final     Lab Results   Component Value Date    INR 0.9 04/30/2019     Lab Results   Component Value Date    TRIG 313 (H) 07/24/2020    HDL 37 07/24/2020     No components found for: HGBA1C  No results found for: PROTEINCSF, GLUCCSF, WBCCSF    Controlled Substance Monitoring:    Acute and Chronic Pain Monitoring:   RX Monitoring 6/4/2019   Attestation The Prescription Monitoring Report for this patient was reviewed today. Periodic Controlled Substance Monitoring Possible medication side effects, risk of tolerance/dependence & alternative treatments discussed. Chronic Pain > 80 MEDD Consulted with a specialist.;Looked for signs of prescription misuse. MEDICAL DECISION MAKING  ASSESSMENT/PLAN    Fercho Paredes was seen today for migraine.     Diagnoses and all orders for this visit:    Worsening headaches    Intractable chronic migraine without aura and with status migrainosus    Chronic daily headache    -     naratriptan (AMERGE) 2.5 MG tablet; Take 1 tablet by mouth as needed for Migraine 2.5 mg at onset of headache, may repeat in 4 hours if needed  -     topiramate (TOPAMAX) 25 MG tablet; Take 1 tablet by mouth nightly for 14 days, THEN 2 tablets nightly. -     MRI BRAIN WO CONTRAST; Future    · Check MRI brain to rule out any secondary causes of headache. · Increase Amerge to 2.5 mg as needed for migraines. · Start on Topamax for preventive treatment of migraine, target dose 50 mg at night. · Pericranial ventricular injections will be available for prophylactic treatment of her headaches. · Risk benefits of medications were discussed with the patient  · The natural course of the disease was discussed with the patient. · Lifestyle behavioral modifications were discussed. · Pericranial nerve block and trigger point patient will be available for abortive treatment of headaches. · Do not drive if you have taken a prescription pain medicine. · Rest in a quiet, dark room until your headache is gone. Close your eyes, and try to relax or go to sleep. Don't watch TV or read. · Put a cold, moist cloth or cold pack on the painful area for 10 to 20 minutes at a time. Put a thin cloth between the cold pack and your skin. · Use a warm, moist towel or a heating pad set on low to relax tight shoulder and neck muscles. · Have someone gently massage your neck and shoulders. · Don't take medicine for headache pain too often. Taking too much pain medicine can lead to more headaches. These are called medicine-overuse headaches. · Keep a headache diary so you can figure out what triggers your headaches. Avoiding triggers may help you prevent headaches. Record when each headache began, how long it lasted, and what the pain was like.  Write down any other symptoms you had with the headache, such as nausea, flashing lights or dark spots, or sensitivity to bright light or loud

## 2020-11-23 ENCOUNTER — TELEPHONE (OUTPATIENT)
Dept: NEUROLOGY | Age: 60
End: 2020-11-23

## 2020-11-23 NOTE — TELEPHONE ENCOUNTER
Patient called in and stated she has had a rash all over her body since she started the Topamax. Informed patient to stop the Topamax. Patient understood. She is scheduled for an appointment with her PCP tomorrow.

## 2020-11-24 ENCOUNTER — TELEPHONE (OUTPATIENT)
Dept: ADMINISTRATIVE | Age: 60
End: 2020-11-24

## 2020-11-24 ENCOUNTER — VIRTUAL VISIT (OUTPATIENT)
Dept: FAMILY MEDICINE CLINIC | Age: 60
End: 2020-11-24
Payer: COMMERCIAL

## 2020-11-24 PROCEDURE — 99213 OFFICE O/P EST LOW 20 MIN: CPT | Performed by: INTERNAL MEDICINE

## 2020-11-24 RX ORDER — ORPHENADRINE CITRATE 100 MG/1
100 TABLET, EXTENDED RELEASE ORAL 2 TIMES DAILY
Qty: 60 TABLET | Refills: 2 | Status: SHIPPED | OUTPATIENT
Start: 2020-11-24

## 2020-11-24 RX ORDER — OXYCODONE AND ACETAMINOPHEN 10; 325 MG/1; MG/1
1 TABLET ORAL EVERY 6 HOURS
Qty: 120 TABLET | Refills: 0 | Status: SHIPPED | OUTPATIENT
Start: 2020-11-24 | End: 2020-12-24

## 2020-11-24 RX ORDER — GABAPENTIN 100 MG/1
100 CAPSULE ORAL 4 TIMES DAILY
Qty: 120 CAPSULE | Refills: 2 | Status: SHIPPED | OUTPATIENT
Start: 2020-11-24 | End: 2020-12-24

## 2020-11-24 NOTE — TELEPHONE ENCOUNTER
Pt was on Dr. Guillermo Campo schedule today for an ov. She called stating she woke up with a slight fever and requested today's 2:30 appt for refills be changed to a VV.   Change was made at pt's request.

## 2020-11-24 NOTE — PROGRESS NOTES
TeleMedicine Video Visit    This visit was performed as a virtual video visit using a synchronous, two-way, audio-video telehealth technology platform. Patient identification was verified at the start of the visit, including the patient's telephone number and physical location. I discussed with the patient the nature of our telehealth visits, that:     1. Due to the nature of an audio- video modality, the only components of a physical exam that could be done are the elements supported by direct observation. 2. I would evaluate the patient and recommend diagnostics and treatments based on my assessment. 3. If it was felt that the patient should be evaluated in clinic or an emergency room setting, then they would be directed there. 4. Our sessions are not being recorded and that personal health information is protected. 5. Our team would provide follow up care in person if/when the patient needs it. Patient does agree to proceed with telemedicine consultation. Patient's location: home address in [facility]  Physician  location other address in [place] [address] Visit    This visit was performed as a virtual video visit using a synchronous, two-way, audio-video telehealth technology platform. Patient identification was verified at the start of the visit, including the patient's telephone number and physical location. I discussed with the patient the nature of our telehealth visits, that:     6. Due to the nature of an audio- video modality, the only components of a physical exam that could be done are the elements supported by direct observation. 7. I would evaluate the patient and recommend diagnostics and treatments based on my assessment. 8. If it was felt that the patient should be evaluated in clinic or an emergency room setting, then they would be directed there. 9. Our sessions are not being recorded and that personal health information is protected.   10. Our team would provide follow up care in person if/when the patient needs it.                   2020     Meliza Inman (:  1960) is a 64 y.o. female, here for evaluation of the following medical concerns:  She is having increasing problems with her migraine cephalgia. She did see neurology who had placed her on some Topamax as it typically has the least amount of side effects. Unfortunately she broke out into a rash and felt nauseated as well as dizzy. The Topamax was stopped and the symptoms have resolved. She does have a follow-up appointment with neurology but not for nearly 6 weeks. OARRS: 2020   Pain management agreement: 10-15-19  PHQ-9: 18  Urine drug screen: 10-8-2019  Pain assessment questionnaire:  She does not use alcohol or tobacco. She is recently had lumbar discectomy performed by neurosurgery. She had an L3- 4- 5 fusion performed on May 7, 2019. Her pain at its worse is an 9/10. Is also has rheumatoid arthritis with positive rheumatoid factor. Is under the care of rheumatology however they will not treat the chronic pain. Review of Systems    Prior to Visit Medications    Medication Sig Taking? Authorizing Provider   naratriptan (AMERGE) 2.5 MG tablet Take 1 tablet by mouth as needed for Migraine 2.5 mg at onset of headache, may repeat in 4 hours if needed Yes Danyell Damian MD   zolpidem (AMBIEN CR) 12.5 MG extended release tablet Take 1 tablet by mouth nightly as needed for Sleep for up to 30 days. Yes Ranjan Schultz, DO   oxyCODONE-acetaminophen (PERCOCET)  MG per tablet Take 1 tablet by mouth every 6 hours for 30 days. Yes Ranjan Schultz DO   orphenadrine (NORFLEX) 100 MG extended release tablet Take 1 tablet by mouth 2 times daily Yes Ranjan Schultz DO   gabapentin (NEURONTIN) 100 MG capsule Take 1 capsule by mouth 4 times daily for 30 days.  1 qid Yes Ranjan Schultz DO   levothyroxine (SYNTHROID) 50 MCG tablet Take 1 tablet by mouth Daily Yes Julian aCrroll Yunier Cardoza,    meloxicam (MOBIC) 15 MG tablet Take 1 tablet by mouth daily Yes Sienna Wynn DO   rosuvastatin (CRESTOR) 40 MG tablet Take 1 tablet by mouth daily Yes Sienna Wynn DO   esomeprazole (NEXIUM) 20 MG delayed release capsule Take 1 capsule by mouth nightly Yes Sienna Wynn DO   aspirin 81 MG tablet Take 81 mg by mouth daily Pt instructed to check hold with dr. Loja Hearing Yes Historical Provider, MD   B Complex Vitamins (VITAMIN B COMPLEX) TABS Take by mouth 2 times daily Indications: 1 po bif (low B12 on 5/2018 labs) Ld 9/7/2019 Yes Historical Provider, MD   nabumetone (RELAFEN) 750 MG tablet Take 750 mg by mouth 2 times daily Yes Historical Provider, MD   Cholecalciferol (VITAMIN D) 2000 UNITS CAPS capsule Take 2,000 Units by mouth daily Yes Historical Provider, MD   hydroxychloroquine (PLAQUENIL) 200 MG tablet Take by mouth 2 times daily  Historical Provider, MD   azelastine (ASTELIN) 0.1 % nasal spray 1 spray by Nasal route 3 times daily as needed for Rhinitis Use in each nostril as directed  Sienna Wynn DO   cyclobenzaprine (FLEXERIL) 10 MG tablet Take 1 tablet by mouth 2 times daily as needed for Muscle spasms Instructed to take am of procedure if needed  Sienna Wynn DO   fluticasone (FLONASE) 50 MCG/ACT nasal spray 1 spray by Each Nostril route daily  Sienna Wynn DO        Social History     Tobacco Use    Smoking status: Never Smoker    Smokeless tobacco: Never Used   Substance Use Topics    Alcohol use: Never     Frequency: Never        There were no vitals filed for this visit. Estimated body mass index is 25.54 kg/m² as calculated from the following:    Height as of 11/18/20: 5' 8\" (1.727 m). Weight as of 11/18/20: 168 lb (76.2 kg). Physical Exam   Very limited physical exam a virtual visit.   She is able to go from a seated to standing position and does not require any assistance and can do it under 30 seconds without any

## 2020-11-25 RX ORDER — NARATRIPTAN 2.5 MG/1
2.5 TABLET ORAL PRN
Qty: 9 TABLET | Refills: 0 | Status: SHIPPED
Start: 2020-11-25 | End: 2021-03-19

## 2020-12-15 ENCOUNTER — TELEPHONE (OUTPATIENT)
Dept: NEUROLOGY | Age: 60
End: 2020-12-15

## 2020-12-15 RX ORDER — DIAZEPAM 5 MG/1
5 TABLET ORAL
Qty: 1 TABLET | Refills: 0 | Status: SHIPPED | OUTPATIENT
Start: 2020-12-15 | End: 2020-12-15

## 2020-12-15 NOTE — TELEPHONE ENCOUNTER
Pt stated she is scheduled to have brain MRI 12/17/20 at 2:45p, and will need something to help her relax

## 2020-12-17 ENCOUNTER — HOSPITAL ENCOUNTER (OUTPATIENT)
Dept: MRI IMAGING | Age: 60
Discharge: HOME | End: 2020-12-19
Payer: COMMERCIAL

## 2020-12-17 PROCEDURE — 70551 MRI BRAIN STEM W/O DYE: CPT

## 2020-12-18 ENCOUNTER — TELEPHONE (OUTPATIENT)
Dept: NEUROLOGY | Age: 60
End: 2020-12-18

## 2020-12-18 RX ORDER — NARATRIPTAN 2.5 MG/1
5 TABLET ORAL PRN
Qty: 9 TABLET | Refills: 0 | Status: SHIPPED
Start: 2020-12-18 | End: 2021-03-19 | Stop reason: SDUPTHER

## 2020-12-18 RX ORDER — NARATRIPTAN 2.5 MG/1
5 TABLET ORAL PRN
Qty: 9 TABLET | Refills: 0 | Status: CANCELLED | OUTPATIENT
Start: 2020-12-18

## 2020-12-18 NOTE — TELEPHONE ENCOUNTER
We can increase it to 5 mg and see how she responds. She should not take more than 5 mg within 24 hours. We have to see if her insurance covers the medication or not.

## 2020-12-18 NOTE — TELEPHONE ENCOUNTER
Notified the patient that her MRI of brain is normal. Also notified that Dr. Ashley Zavala can increase the Amerge to 5mg but she should not take more that 5mg in 24 hours. Patient understood.

## 2020-12-18 NOTE — TELEPHONE ENCOUNTER
Pt states she was in formed by provider to contact office if she felt she needed a higher dose of medication (Amerge) pt states medication 2.5mg is no longer working and think she may need higher dose.

## 2021-03-01 ENCOUNTER — OFFICE VISIT (OUTPATIENT)
Dept: NEUROLOGY | Age: 61
End: 2021-03-01
Payer: COMMERCIAL

## 2021-03-01 VITALS
TEMPERATURE: 97.1 F | RESPIRATION RATE: 16 BRPM | HEART RATE: 74 BPM | WEIGHT: 163 LBS | SYSTOLIC BLOOD PRESSURE: 142 MMHG | OXYGEN SATURATION: 99 % | DIASTOLIC BLOOD PRESSURE: 86 MMHG | BODY MASS INDEX: 24.71 KG/M2 | HEIGHT: 68 IN

## 2021-03-01 DIAGNOSIS — T88.7XXA MEDICATION SIDE EFFECT: ICD-10-CM

## 2021-03-01 DIAGNOSIS — G43.711 INTRACTABLE CHRONIC MIGRAINE WITHOUT AURA AND WITH STATUS MIGRAINOSUS: Primary | ICD-10-CM

## 2021-03-01 DIAGNOSIS — R51.9 CHRONIC DAILY HEADACHE: ICD-10-CM

## 2021-03-01 PROCEDURE — 99214 OFFICE O/P EST MOD 30 MIN: CPT | Performed by: PSYCHIATRY & NEUROLOGY

## 2021-03-01 RX ORDER — PROPRANOLOL HCL 60 MG
60 CAPSULE, EXTENDED RELEASE 24HR ORAL DAILY
Qty: 30 CAPSULE | Refills: 0 | Status: SHIPPED
Start: 2021-03-01 | End: 2021-04-05 | Stop reason: SDUPTHER

## 2021-03-01 SDOH — HEALTH STABILITY: MENTAL HEALTH: HOW OFTEN DO YOU HAVE A DRINK CONTAINING ALCOHOL?: NOT ASKED

## 2021-03-01 ASSESSMENT — ENCOUNTER SYMPTOMS
PHOTOPHOBIA: 0
SHORTNESS OF BREATH: 0
VOMITING: 0
NAUSEA: 0
TROUBLE SWALLOWING: 0

## 2021-03-01 NOTE — PROGRESS NOTES
Fibromyalgia     GERD (gastroesophageal reflux disease)     Hypercholesterolemia     Hypothyroidism     Irritable bowel syndrome     Lump of right breast     Seasonal allergies     Toe fracture, left      PAST SURGICAL HISTORY:   Past Surgical History:   Procedure Laterality Date    APPENDECTOMY      BACK SURGERY      CHOLECYSTECTOMY      COLON SURGERY      COLONOSCOPY      FOOT FRACTURE SURGERY Left 9/13/2019    LEFT GREAT PHALANX OPEN REDUCTION INTERNAL FIXATION (SYNTHES) performed by Magaly Anderson DPM at [facility] OR    HYSTERECTOMY      JOINT REPLACEMENT      TOE SURGERY Right     great toe    TONSILLECTOMY      TOTAL HIP ARTHROPLASTY      left     FAMILY MEDICAL HISTORY:   Family History   Problem Relation Age of Onset    High Blood Pressure Mother     Cancer Mother 68        colon   [facility] Migraines Mother     High Blood Pressure Father     Heart Disease Father     High Blood Pressure Brother     Diabetes Brother     Cancer Maternal Cousin         breast cancer    Cancer Paternal Cousin         breast cancer    Migraines Maternal Grandmother      SOCIAL HISTORY:   Social History     Socioeconomic History    Marital status:      Spouse name: None    Number of children: None    Years of education: None    Highest education level: None   Occupational History    None   Social Needs    Financial resource strain: None    Food insecurity     Worry: None     Inability: None    Transportation needs     Medical: None     Non-medical: None   Tobacco Use    Smoking status: Never Smoker    Smokeless tobacco: Never Used   Substance and Sexual Activity    Alcohol use: Not Currently     Comment: occasionally    Drug use: Never    Sexual activity: None   Lifestyle    Physical activity     Days per week: None     Minutes per session: None    Stress: None   Relationships    Social connections     Talks on phone: None     Gets together: None     Attends Orthodoxy service: None     Active member of club or organization: None     Attends meetings of clubs or organizations: None     Relationship status: None    Intimate partner violence     Fear of current or ex partner: None     Emotionally abused: None     Physically abused: None     Forced sexual activity: None   Other Topics Concern    None   Social History Narrative    None      E-Cigarettes/Vaping Use     Questions Responses    E-Cigarette/Vaping Use Never User    Start Date     Passive Exposure     Quit Date     Counseling Given     Comments          Allergy:   Allergies   Allergen Reactions    Seasonal     Topamax [Topiramate]     Sulfa Antibiotics Rash     MEDS:   Current Outpatient Medications:     naratriptan (AMERGE) 2.5 MG tablet, Take 2 tablets by mouth as needed for Migraine Take one (1) tablet at the onset of headache; if headache returns or does not fully resolve, the dose may be repeated after 4 hours; do not exceed five (5)mg in 24 hours. , Disp: 9 tablet, Rfl: 0    naratriptan (AMERGE) 2.5 MG tablet, Take 1 tablet by mouth as needed for Migraine 2.5 mg at onset of headache, may repeat in 4 hours if needed, Disp: 9 tablet, Rfl: 0    orphenadrine (NORFLEX) 100 MG extended release tablet, Take 1 tablet by mouth 2 times daily, Disp: 60 tablet, Rfl: 2    hydroxychloroquine (PLAQUENIL) 200 MG tablet, Take by mouth 2 times daily, Disp: , Rfl:     azelastine (ASTELIN) 0.1 % nasal spray, 1 spray by Nasal route 3 times daily as needed for Rhinitis Use in each nostril as directed, Disp: 2 Bottle, Rfl: 1    levothyroxine (SYNTHROID) 50 MCG tablet, Take 1 tablet by mouth Daily, Disp: 90 tablet, Rfl: 1    meloxicam (MOBIC) 15 MG tablet, Take 1 tablet by mouth daily, Disp: 90 tablet, Rfl: 1    rosuvastatin (CRESTOR) 40 MG tablet, Take 1 tablet by mouth daily, Disp: 90 tablet, Rfl: 1    cyclobenzaprine (FLEXERIL) 10 MG tablet, Take 1 tablet by mouth 2 times daily as needed for Muscle spasms Instructed to take am of procedure if needed, Disp: 90 tablet, Rfl: 3    esomeprazole (NEXIUM) 20 MG delayed release capsule, Take 1 capsule by mouth nightly, Disp: 30 capsule, Rfl: 5    aspirin 81 MG tablet, Take 81 mg by mouth daily Pt instructed to check hold with dr. fox, Disp: , Rfl:     B Complex Vitamins (VITAMIN B COMPLEX) TABS, Take by mouth 2 times daily Indications: 1 po bif (low B12 on 5/2018 labs) Ld 9/7/2019, Disp: , Rfl:     fluticasone (FLONASE) 50 MCG/ACT nasal spray, 1 spray by Each Nostril route daily, Disp: 1 Bottle, Rfl: 2    nabumetone (RELAFEN) 750 MG tablet, Take 750 mg by mouth 2 times daily, Disp: , Rfl:     Cholecalciferol (VITAMIN D) 2000 UNITS CAPS capsule, Take 2,000 Units by mouth daily, Disp: , Rfl:     oxyCODONE-acetaminophen (PERCOCET)  MG per tablet, Take 1 tablet by mouth every 6 hours for 30 days. , Disp: 120 tablet, Rfl: 0    gabapentin (NEURONTIN) 100 MG capsule, Take 1 capsule by mouth 4 times daily for 30 days. 1 qid, Disp: 120 capsule, Rfl: 2    zolpidem (AMBIEN CR) 12.5 MG extended release tablet, Take 1 tablet by mouth nightly as needed for Sleep for up to 30 days. , Disp: 30 tablet, Rfl: 2    REVIEW OF SYSTEMS  Review of Systems   Constitutional: Negative for appetite change, fatigue and unexpected weight change. HENT: Negative for drooling, hearing loss, tinnitus and trouble swallowing. Eyes: Negative for photophobia and visual disturbance. Respiratory: Negative for shortness of breath. Cardiovascular: Negative for palpitations. Gastrointestinal: Negative for nausea and vomiting. Endocrine: Negative for polyuria. Genitourinary: Negative for flank pain. Musculoskeletal: Negative for neck pain and neck stiffness. Skin: Negative for rash. Allergic/Immunologic: Negative for food allergies. Neurological: Positive for headaches. Negative for dizziness, tremors, seizures, syncope, speech difficulty, weakness, light-headedness and numbness.    Hematological: Negative for adenopathy. Psychiatric/Behavioral: Negative for agitation, behavioral problems and sleep disturbance. PHYSICAL EXAM:   BP (!) 142/86   Pulse 74   Temp 97.1 °F (36.2 °C) (Temporal)   Resp 16   Ht 5' 8\" (1.727 m)   Wt 163 lb (73.9 kg)   SpO2 99%   BMI 24.78 kg/m²       Sodium   Date Value Ref Range Status   07/24/2020 145 135 - 145 mEq/L Final   07/23/2020 140 135 - 145 mEq/L Final   06/30/2020 143 132 - 146 mmol/L Final     Potassium   Date Value Ref Range Status   07/24/2020 3.7 3.6 - 5.0 mEq/L Final   07/23/2020 3.3 (L) 3.6 - 5.0 mEq/L Final   06/30/2020 4.3 3.5 - 5.0 mmol/L Final     Chloride   Date Value Ref Range Status   07/24/2020 114 (H) 98 - 107 mEq/L Final   07/23/2020 107 98 - 107 mEq/L Final   06/30/2020 105 98 - 107 mmol/L Final     CO2   Date Value Ref Range Status   07/24/2020 27 21 - 31 mEq/L Final   07/23/2020 26 21 - 31 mEq/L Final   06/30/2020 23 22 - 29 mmol/L Final     BUN   Date Value Ref Range Status   07/24/2020 9 8 - 21 mg/dL Final   07/23/2020 11 8 - 21 mg/dL Final   06/30/2020 9 6 - 20 mg/dL Final     CREATININE   Date Value Ref Range Status   07/24/2020 0.6 0.4 - 1.0 mg/dL Final   07/23/2020 0.7 0.4 - 1.0 mg/dL Final   06/30/2020 0.7 0.5 - 1.0 mg/dL Final     GFR Non-   Date Value Ref Range Status   07/24/2020 103 >60 mL/min Final   07/23/2020 86 >60 mL/min Final   06/30/2020 >60 >=60 mL/min/1.73 Final     Comment:     Chronic Kidney Disease: less than 60 ml/min/1.73 sq.m. Kidney Failure: less than 15 ml/min/1.73 sq.m. Results valid for patients 18 years and older.        Calcium   Date Value Ref Range Status   07/24/2020 8.8 8.5 - 10.5 mg/dL Final   07/23/2020 9.3 8.5 - 10.5 mg/dL Final   06/30/2020 9.5 8.6 - 10.2 mg/dL Final     Phosphorus   Date Value Ref Range Status   06/30/2020 4.2 2.5 - 4.5 mg/dL Final   09/06/2019 3.6 2.5 - 4.5 mg/dL Final     Magnesium   Date Value Ref Range Status   07/24/2020 1.5 (L) 1.6 - 2.6 mEq/L Final     WBC   Date results found for: PROTEINCSF, GLUCCSF, WBCCSF    Controlled Substance Monitoring:    Acute and Chronic Pain Monitoring:   RX Monitoring 6/4/2019   Attestation The Prescription Monitoring Report for this patient was reviewed today. Periodic Controlled Substance Monitoring Possible medication side effects, risk of tolerance/dependence & alternative treatments discussed. Chronic Pain > 80 MEDD Consulted with a specialist.;Looked for signs of prescription misuse. MEDICAL DECISION MAKING  ASSESSMENT/PLAN    Rhett Rodriguez was seen today for migraine. Diagnoses and all orders for this visit:    Intractable chronic migraine without aura and with status migrainosus    Chronic daily headache    Side effects from medication    -     propranolol (INDERAL LA) 60 MG extended release capsule; Take 1 capsule by mouth daily    · MRI brain: No acute abnormality  · Migraines are getting worse, currently she is getting about 15-20 migraine headaches in a month  · Topamax caused her to have severe rash currently she is off of Topamax  · Continue with Amerge  · Start on propranolol 60 mg long-acting for prophylactic treatment. · Pericranial  injections will be available for prophylactic treatment of her headaches. · Risk benefits of medications were discussed with the patient  · The natural course of the disease was discussed with the patient. · Lifestyle behavioral modifications were discussed. · Pericranial nerve block and trigger point patient will be available for abortive treatment of headaches. · Do not drive if you have taken a prescription pain medicine. · Rest in a quiet, dark room until your headache is gone. Close your eyes, and try to relax or go to sleep. Don't watch TV or read. · Put a cold, moist cloth or cold pack on the painful area for 10 to 20 minutes at a time. Put a thin cloth between the cold pack and your skin.   · Use a warm, moist towel or a heating pad set on low to relax tight shoulder and neck muscles. · Have someone gently massage your neck and shoulders. · Don't take medicine for headache pain too often. Taking too much pain medicine can lead to more headaches. These are called medicine-overuse headaches. · Keep a headache diary so you can figure out what triggers your headaches. Avoiding triggers may help you prevent headaches. Record when each headache began, how long it lasted, and what the pain was like. Write down any other symptoms you had with the headache, such as nausea, flashing lights or dark spots, or sensitivity to bright light or loud noise. Note if the headache occurred near your period. List anything that might have triggered the headache. Triggers may include certain foods (chocolate, cheese, wine) or odors, smoke, bright light, stress, or lack of sleep. · Find healthy ways to deal with stress. Migraines are most common during or right after stressful times. Try finding ways to reduce stress like practicing mindfulness or deep breathing exercises. · Get plenty of sleep and exercise. But be careful to not push yourself too hard during exercise. It may trigger a headache. · Eat meals on a regular schedule. Avoid foods and drinks that often trigger migraines. These include chocolate, alcohol (especially red wine and port), aspartame, monosodium glutamate (MSG), and some additives found in foods (such as hot dogs, farrell, cold cuts, aged cheeses, and pickled foods). · Limit caffeine. Don't drink too much coffee, tea, or soda. But don't quit caffeine suddenly. That can also give you migraines. · Do not smoke or allow others to smoke around you. Follow-up in 8 weeks    Thank you for involving me in the care of your patient. Today, I personally spent a great amount of time directly face-to-face time with the patient, of which greater than 50% was spent in patient education, counseling,about etiology management and diagnosis of migraines, headaches, chronic daily headaches.   Side

## 2021-03-19 RX ORDER — NARATRIPTAN 2.5 MG/1
2.5 TABLET ORAL PRN
Qty: 9 TABLET | Refills: 0 | Status: SHIPPED
Start: 2021-03-19 | End: 2021-04-28 | Stop reason: SDUPTHER

## 2021-03-29 DIAGNOSIS — G43.711 INTRACTABLE CHRONIC MIGRAINE WITHOUT AURA AND WITH STATUS MIGRAINOSUS: ICD-10-CM

## 2021-04-02 DIAGNOSIS — G43.711 INTRACTABLE CHRONIC MIGRAINE WITHOUT AURA AND WITH STATUS MIGRAINOSUS: ICD-10-CM

## 2021-04-05 RX ORDER — PROPRANOLOL HCL 60 MG
60 CAPSULE, EXTENDED RELEASE 24HR ORAL DAILY
Qty: 30 CAPSULE | Refills: 1 | Status: SHIPPED
Start: 2021-04-05 | End: 2021-04-05

## 2021-04-05 RX ORDER — PROPRANOLOL HCL 60 MG
CAPSULE, EXTENDED RELEASE 24HR ORAL
Qty: 30 CAPSULE | Refills: 0 | Status: SHIPPED
Start: 2021-04-05 | End: 2021-04-28 | Stop reason: SDUPTHER

## 2021-04-28 ENCOUNTER — OFFICE VISIT (OUTPATIENT)
Dept: NEUROLOGY | Age: 61
End: 2021-04-28
Payer: COMMERCIAL

## 2021-04-28 VITALS
HEART RATE: 72 BPM | HEIGHT: 68 IN | RESPIRATION RATE: 16 BRPM | OXYGEN SATURATION: 99 % | BODY MASS INDEX: 24.4 KG/M2 | SYSTOLIC BLOOD PRESSURE: 125 MMHG | WEIGHT: 161 LBS | TEMPERATURE: 98.4 F | DIASTOLIC BLOOD PRESSURE: 85 MMHG

## 2021-04-28 DIAGNOSIS — R51.9 CHRONIC DAILY HEADACHE: Primary | ICD-10-CM

## 2021-04-28 DIAGNOSIS — G43.711 INTRACTABLE CHRONIC MIGRAINE WITHOUT AURA AND WITH STATUS MIGRAINOSUS: ICD-10-CM

## 2021-04-28 PROCEDURE — 99214 OFFICE O/P EST MOD 30 MIN: CPT | Performed by: PSYCHIATRY & NEUROLOGY

## 2021-04-28 RX ORDER — PROPRANOLOL HCL 60 MG
60 CAPSULE, EXTENDED RELEASE 24HR ORAL DAILY
Qty: 90 CAPSULE | Refills: 1 | Status: SHIPPED
Start: 2021-04-28 | End: 2021-11-18 | Stop reason: SDUPTHER

## 2021-04-28 RX ORDER — NARATRIPTAN 2.5 MG/1
2.5 TABLET ORAL PRN
Qty: 9 TABLET | Refills: 0 | Status: SHIPPED
Start: 2021-04-28 | End: 2021-09-07 | Stop reason: SDUPTHER

## 2021-04-28 ASSESSMENT — ENCOUNTER SYMPTOMS
SHORTNESS OF BREATH: 0
PHOTOPHOBIA: 0
TROUBLE SWALLOWING: 0
NAUSEA: 0
VOMITING: 0

## 2021-04-28 NOTE — PROGRESS NOTES
NEUROLOGY FOLLOW UP NOTE     Date: 4/28/2021  Name: Joan Pitt  MRN: [id number]  Patient's PCP: Sunil Zuluaga DO     REASON FOR VISIT/CHIEF COMPLAINT: Follow up for Migraine headaches      Interval History:    The patient is coming in for a follow-up visit. She has stopped taking the Topamax to be due to rash  Currently she is taking propranolol which has significantly improved her headaches  Now she gets about 6-8 headaches a month  She is also taking MRI with significant symptoms  No other aggravating relieving factors  No other associated symptoms     MRI brain: No acute abnormality    Disease course:     Joan Pitt is a 64 y.o.  female past medical history of rheumatoid arthritis, fibromyalgia, migraine headaches is coming into establish care.  Onset: In her 30s   Duration: 4 hours to 36 hours   Frequency : 3 times a week   Time of occurrence: Any time   Severity on a scale of 1-10: 10/10    Headache Location: Right frontotemporal region radiating all over the head, intermittently in bilateral occipital region. Change sides: Yes    Character:pressure, pulsating, shooting, stabbing and tight band    Activities that worsens headache: none    Reliving factors: rest and sleep    Headache Triggers or Provoking Factors:   nothing in particular    Headache disability during or after an attack:   severe decrease in function    Associated symptoms:  nausea, vomiting, photophobia and sonophobia    Aura (Visual/Sensory):  visual scintillations    Premonitory Symptoms:  none       Prior Headache Treatments:    Preventatives:    Topamax: Rash  Abortives:   Amerge: partial help    Total number of migraine headache days in a month:6-8    I have personally reviewed her medical records     PAST MEDICAL HISTORY:   Past Medical History:   Diagnosis Date    Anemia     Arthritis, rheumatoid (HCC)     Cancer (HCC)     skin    Fibromyalgia     GERD (gastroesophageal reflux disease)     Hypercholesterolemia     Hypothyroidism     Irritable bowel syndrome     Lump of right breast     Seasonal allergies     Toe fracture, left      PAST SURGICAL HISTORY:   Past Surgical History:   Procedure Laterality Date    APPENDECTOMY      BACK SURGERY      CHOLECYSTECTOMY      COLON SURGERY      COLONOSCOPY      FOOT FRACTURE SURGERY Left 9/13/2019    LEFT GREAT PHALANX OPEN REDUCTION INTERNAL FIXATION (SYNTHES) performed by Aruna Sommers DPM at [facility] OR    HYSTERECTOMY      JOINT REPLACEMENT      TOE SURGERY Right     great toe    TONSILLECTOMY      TOTAL HIP ARTHROPLASTY      left     FAMILY MEDICAL HISTORY:   Family History   Problem Relation Age of Onset    High Blood Pressure Mother     Cancer Mother 68        colon   [facility] Migraines Mother     High Blood Pressure Father     Heart Disease Father     High Blood Pressure Brother     Diabetes Brother     Cancer Maternal Cousin         breast cancer    Cancer Paternal Cousin         breast cancer    Migraines Maternal Grandmother      SOCIAL HISTORY:   Social History     Socioeconomic History    Marital status:      Spouse name: None    Number of children: None    Years of education: None    Highest education level: None   Occupational History    None   Social Needs    Financial resource strain: None    Food insecurity     Worry: None     Inability: None    Transportation needs     Medical: None     Non-medical: None   Tobacco Use    Smoking status: Never Smoker    Smokeless tobacco: Never Used   Substance and Sexual Activity    Alcohol use: Not Currently     Comment: occasionally    Drug use: Never    Sexual activity: None   Lifestyle    Physical activity     Days per week: None     Minutes per session: None    Stress: None   Relationships    Social connections     Talks on phone: None     Gets together: None     Attends Denominational service: None     Active member of club or organization: None     Attends meetings of clubs or organizations: None     Relationship status: None    Intimate partner violence     Fear of current or ex partner: None     Emotionally abused: None     Physically abused: None     Forced sexual activity: None   Other Topics Concern    None   Social History Narrative    None      E-Cigarettes/Vaping Use     Questions Responses    E-Cigarette/Vaping Use Never User    Start Date     Passive Exposure     Quit Date     Counseling Given     Comments          Allergy:   Allergies   Allergen Reactions    Seasonal     Topamax [Topiramate]     Sulfa Antibiotics Rash     MEDS:   Current Outpatient Medications:     propranolol (INDERAL LA) 60 MG extended release capsule, Take 1 capsule by mouth daily, Disp: 90 capsule, Rfl: 1    naratriptan (AMERGE) 2.5 MG tablet, Take 1 tablet by mouth as needed for Migraine, Disp: 9 tablet, Rfl: 0    orphenadrine (NORFLEX) 100 MG extended release tablet, Take 1 tablet by mouth 2 times daily, Disp: 60 tablet, Rfl: 2    hydroxychloroquine (PLAQUENIL) 200 MG tablet, Take by mouth 2 times daily, Disp: , Rfl:     azelastine (ASTELIN) 0.1 % nasal spray, 1 spray by Nasal route 3 times daily as needed for Rhinitis Use in each nostril as directed, Disp: 2 Bottle, Rfl: 1    levothyroxine (SYNTHROID) 50 MCG tablet, Take 1 tablet by mouth Daily, Disp: 90 tablet, Rfl: 1    meloxicam (MOBIC) 15 MG tablet, Take 1 tablet by mouth daily, Disp: 90 tablet, Rfl: 1    rosuvastatin (CRESTOR) 40 MG tablet, Take 1 tablet by mouth daily, Disp: 90 tablet, Rfl: 1    cyclobenzaprine (FLEXERIL) 10 MG tablet, Take 1 tablet by mouth 2 times daily as needed for Muscle spasms Instructed to take am of procedure if needed, Disp: 90 tablet, Rfl: 3    esomeprazole (NEXIUM) 20 MG delayed release capsule, Take 1 capsule by mouth nightly, Disp: 30 capsule, Rfl: 5    aspirin 81 MG tablet, Take 81 mg by mouth daily Pt instructed to check hold with dr. fox, Disp: , Rfl:     B Complex Vitamins (VITAMIN B COMPLEX) TABS, Take by mouth 2 times daily Indications: 1 po bif (low B12 on 5/2018 labs) Ld 9/7/2019, Disp: , Rfl:     fluticasone (FLONASE) 50 MCG/ACT nasal spray, 1 spray by Each Nostril route daily, Disp: 1 Bottle, Rfl: 2    nabumetone (RELAFEN) 750 MG tablet, Take 750 mg by mouth 2 times daily, Disp: , Rfl:     Cholecalciferol (VITAMIN D) 2000 UNITS CAPS capsule, Take 2,000 Units by mouth daily, Disp: , Rfl:     oxyCODONE-acetaminophen (PERCOCET)  MG per tablet, Take 1 tablet by mouth every 6 hours for 30 days. , Disp: 120 tablet, Rfl: 0    gabapentin (NEURONTIN) 100 MG capsule, Take 1 capsule by mouth 4 times daily for 30 days. 1 qid, Disp: 120 capsule, Rfl: 2    zolpidem (AMBIEN CR) 12.5 MG extended release tablet, Take 1 tablet by mouth nightly as needed for Sleep for up to 30 days. , Disp: 30 tablet, Rfl: 2    REVIEW OF SYSTEMS  Review of Systems   Constitutional: Negative for appetite change, fatigue and unexpected weight change. HENT: Negative for drooling, hearing loss, tinnitus and trouble swallowing. Eyes: Negative for photophobia and visual disturbance. Respiratory: Negative for shortness of breath. Cardiovascular: Negative for palpitations. Gastrointestinal: Negative for nausea and vomiting. Endocrine: Negative for polyuria. Genitourinary: Negative for flank pain. Musculoskeletal: Negative for neck pain and neck stiffness. Skin: Negative for rash. Allergic/Immunologic: Negative for food allergies. Neurological: Positive for headaches. Negative for dizziness, tremors, seizures, syncope, speech difficulty, weakness, light-headedness and numbness. Hematological: Negative for adenopathy. Psychiatric/Behavioral: Negative for agitation, behavioral problems and sleep disturbance.          PHYSICAL EXAM:   /85   Pulse 72   Temp 98.4 °F (36.9 °C) (Temporal)   Resp 16   Ht 5' 8\" (1.727 m)   Wt 161 lb (73 kg)   SpO2 99%   BMI 24.48 kg/m²       Sodium   Date Value Ref Range Status   07/24/2020 145 135 - 145 mEq/L Final   07/23/2020 140 135 - 145 mEq/L Final   06/30/2020 143 132 - 146 mmol/L Final     Potassium   Date Value Ref Range Status   07/24/2020 3.7 3.6 - 5.0 mEq/L Final   07/23/2020 3.3 (L) 3.6 - 5.0 mEq/L Final   06/30/2020 4.3 3.5 - 5.0 mmol/L Final     Chloride   Date Value Ref Range Status   07/24/2020 114 (H) 98 - 107 mEq/L Final   07/23/2020 107 98 - 107 mEq/L Final   06/30/2020 105 98 - 107 mmol/L Final     CO2   Date Value Ref Range Status   07/24/2020 27 21 - 31 mEq/L Final   07/23/2020 26 21 - 31 mEq/L Final   06/30/2020 23 22 - 29 mmol/L Final     BUN   Date Value Ref Range Status   07/24/2020 9 8 - 21 mg/dL Final   07/23/2020 11 8 - 21 mg/dL Final   06/30/2020 9 6 - 20 mg/dL Final     CREATININE   Date Value Ref Range Status   07/24/2020 0.6 0.4 - 1.0 mg/dL Final   07/23/2020 0.7 0.4 - 1.0 mg/dL Final   06/30/2020 0.7 0.5 - 1.0 mg/dL Final     GFR Non-   Date Value Ref Range Status   07/24/2020 103 >60 mL/min Final   07/23/2020 86 >60 mL/min Final   06/30/2020 >60 >=60 mL/min/1.73 Final     Comment:     Chronic Kidney Disease: less than 60 ml/min/1.73 sq.m. Kidney Failure: less than 15 ml/min/1.73 sq.m. Results valid for patients 18 years and older.        Calcium   Date Value Ref Range Status   07/24/2020 8.8 8.5 - 10.5 mg/dL Final   07/23/2020 9.3 8.5 - 10.5 mg/dL Final   06/30/2020 9.5 8.6 - 10.2 mg/dL Final     Phosphorus   Date Value Ref Range Status   06/30/2020 4.2 2.5 - 4.5 mg/dL Final   09/06/2019 3.6 2.5 - 4.5 mg/dL Final     Magnesium   Date Value Ref Range Status   07/24/2020 1.5 (L) 1.6 - 2.6 mEq/L Final     WBC   Date Value Ref Range Status   07/24/2020 5.4 4.5 - 11.0 K/uL Final   07/23/2020 7.1 4.5 - 11.0 K/uL Final   06/30/2020 5.9 4.5 - 11.5 E9/L Final     Hemoglobin   Date Value Ref Range Status   07/24/2020 10.6 (L) 12.0 - 15.0 g/dL Final   07/23/2020 11.9 (L) 12.0 - 15.0 g/dL Final   06/30/2020 12.4 11.5 - 15.5 g/dL Final     Hematocrit   Date Value Ref Range Status   07/24/2020 31.4 (L) 36.0 - 44.0 % Final   07/23/2020 35.5 (L) 36.0 - 44.0 % Final   06/30/2020 40.8 34.0 - 48.0 % Final     Platelets   Date Value Ref Range Status   07/24/2020 226 150 - 450 K/uL Final   07/23/2020 276 150 - 450 K/uL Final   06/30/2020 260 130 - 450 E9/L Final     Neutrophils %   Date Value Ref Range Status   06/30/2020 54.8 43.0 - 80.0 % Final   10/08/2019 58.1 % Final   09/06/2019 52.5 43.0 - 80.0 % Final     Monocytes %   Date Value Ref Range Status   07/24/2020 8.1 3.4 - 9.0 % Final   07/23/2020 8.3 3.4 - 9.0 % Final   06/30/2020 8.6 2.0 - 12.0 % Final     Eosinophils %   Date Value Ref Range Status   10/08/2019 3.6 % Final     Total Protein   Date Value Ref Range Status   07/23/2020 8.2 (H) 5.9 - 7.8 g/dL Final   06/30/2020 7.8 6.4 - 8.3 g/dL Final   09/06/2019 7.6 6.4 - 8.3 g/dL Final     Total Bilirubin   Date Value Ref Range Status   07/23/2020 0.5 0.3 - 1.5 mg/dL Final   06/30/2020 <0.2 0.0 - 1.2 mg/dL Final   10/08/2019 0.4 0.1 - 1.4 mg/dL Final     Alkaline Phosphatase   Date Value Ref Range Status   07/23/2020 73 42 - 121 U/L Final   06/30/2020 85 35 - 104 U/L Final   10/08/2019 74 U/L Final     ALT   Date Value Ref Range Status   07/23/2020 11 10 - 54 U/L Final   06/30/2020 6 0 - 32 U/L Final   10/08/2019 12 U/L Final     AST   Date Value Ref Range Status   07/23/2020 19 10 - 41 U/L Final   06/30/2020 17 0 - 31 U/L Final   10/08/2019 22 U/L Final     Lab Results   Component Value Date    INR 0.9 04/30/2019     Lab Results   Component Value Date    TRIG 313 (H) 07/24/2020    HDL 37 07/24/2020     No components found for: HGBA1C  No results found for: PROTEINCSF, GLUCCSF, WBCCSF    Controlled Substance Monitoring:    Acute and Chronic Pain Monitoring:   RX Monitoring 6/4/2019   Attestation The Prescription Monitoring Report for this patient was reviewed today.    Periodic Controlled Substance Monitoring Possible medication side effects, risk of tolerance/dependence & alternative treatments discussed. Chronic Pain > 80 MEDD Consulted with a specialist.;Looked for signs of prescription misuse. MEDICAL DECISION MAKING  ASSESSMENT/PLAN    Marilynn Vickers was seen today for migraine. Diagnoses and all orders for this visit:    Intractable chronic migraine without aura and with status migrainosus    Chronic daily headache    -     propranolol (INDERAL LA) 60 MG extended release capsule; Take 1 capsule by mouth daily  -     naratriptan (AMERGE) 2.5 MG tablet; Take 1 tablet by mouth as needed for Migraine      · MRI brain: No acute abnormality  · She is off of Topamax  · Significant improvement in migraine headaches after starting propranolol  · Continue with propranolol 60 mg daily  · Continue with Amerge  · Pericranial  injections will be available for prophylactic treatment of her headaches. · Risk benefits of medications were discussed with the patient  · The natural course of the disease was discussed with the patient. · Lifestyle behavioral modifications were discussed. · Do not drive if you have taken a prescription pain medicine. · Rest in a quiet, dark room until your headache is gone. Close your eyes, and try to relax or go to sleep. Don't watch TV or read. · Put a cold, moist cloth or cold pack on the painful area for 10 to 20 minutes at a time. Put a thin cloth between the cold pack and your skin. · Use a warm, moist towel or a heating pad set on low to relax tight shoulder and neck muscles. · Have someone gently massage your neck and shoulders. · Don't take medicine for headache pain too often. Taking too much pain medicine can lead to more headaches. These are called medicine-overuse headaches. · Keep a headache diary so you can figure out what triggers your headaches. Avoiding triggers may help you prevent headaches. Record when each headache began, how long it lasted, and what the pain was like.  Write down any other symptoms you had with the headache, such as nausea, flashing lights or dark spots, or sensitivity to bright light or loud noise. Note if the headache occurred near your period. List anything that might have triggered the headache. Triggers may include certain foods (chocolate, cheese, wine) or odors, smoke, bright light, stress, or lack of sleep. · Find healthy ways to deal with stress. Migraines are most common during or right after stressful times. Try finding ways to reduce stress like practicing mindfulness or deep breathing exercises. · Get plenty of sleep and exercise. But be careful to not push yourself too hard during exercise. It may trigger a headache. · Eat meals on a regular schedule. Avoid foods and drinks that often trigger migraines. These include chocolate, alcohol (especially red wine and port), aspartame, monosodium glutamate (MSG), and some additives found in foods (such as hot dogs, farrell, cold cuts, aged cheeses, and pickled foods). · Limit caffeine. Don't drink too much coffee, tea, or soda. But don't quit caffeine suddenly. That can also give you migraines. · Do not smoke or allow others to smoke around you. Follow-up in 6 months     Thank you for involving me in the care of your patient. Today, I personally spent a great amount of time directly face-to-face time with the patient, of which greater than 50% was spent in patient education, counseling,about etiology management and diagnosis of migraines, headaches, chronic daily headaches. Side effects of medications including Nnaratriptan, propranolol were discussed in detail with the patient, verbalizes understanding and agrees to it. And coordination of care as described above. Patient's current medication list, allergies, problem list and results of all previously ordered testing and scans were reviewed at today's visit.       Cathie Francisco MD    [facility]  [address] [place] [address]

## 2021-09-07 RX ORDER — NARATRIPTAN 2.5 MG/1
2.5 TABLET ORAL PRN
Qty: 9 TABLET | Refills: 0 | Status: SHIPPED | OUTPATIENT
Start: 2021-09-07

## 2021-11-18 DIAGNOSIS — G43.711 INTRACTABLE CHRONIC MIGRAINE WITHOUT AURA AND WITH STATUS MIGRAINOSUS: ICD-10-CM

## 2021-11-18 RX ORDER — PROPRANOLOL HCL 60 MG
60 CAPSULE, EXTENDED RELEASE 24HR ORAL DAILY
Qty: 90 CAPSULE | Refills: 1 | Status: SHIPPED | OUTPATIENT
Start: 2021-11-18

## 2023-08-22 ENCOUNTER — HOSPITAL ENCOUNTER (OUTPATIENT)
Dept: DATA CONVERSION | Facility: HOSPITAL | Age: 63
End: 2023-08-22
Attending: ORTHOPAEDIC SURGERY | Admitting: ORTHOPAEDIC SURGERY
Payer: COMMERCIAL

## 2023-08-22 DIAGNOSIS — L08.9 LOCAL INFECTION OF THE SKIN AND SUBCUTANEOUS TISSUE, UNSPECIFIED: ICD-10-CM

## 2023-08-22 DIAGNOSIS — M65.841 OTHER SYNOVITIS AND TENOSYNOVITIS, RIGHT HAND: ICD-10-CM

## 2023-08-22 LAB
ERYTHROCYTE DISTRIBUTION WIDTH (RATIO) BY AUTOMATED COUNT: 15.6 % (ref 11.5–14.5)
ERYTHROCYTE MEAN CORPUSCULAR HEMOGLOBIN CONCENTRATION (G/DL) BY AUTOMATED: 31 G/DL (ref 32–36)
ERYTHROCYTE MEAN CORPUSCULAR VOLUME (FL) BY AUTOMATED COUNT: 81 FL (ref 80–100)
ERYTHROCYTES (10*6/UL) IN BLOOD BY AUTOMATED COUNT: 4.51 X10E12/L (ref 4–5.2)
HEMATOCRIT (%) IN BLOOD BY AUTOMATED COUNT: 36.4 % (ref 36–46)
HEMOGLOBIN (G/DL) IN BLOOD: 11.3 G/DL (ref 12–16)
LEUKOCYTES (10*3/UL) IN BLOOD BY AUTOMATED COUNT: 6.4 X10E9/L (ref 4.4–11.3)
PLATELETS (10*3/UL) IN BLOOD AUTOMATED COUNT: 247 X10E9/L (ref 150–450)

## 2023-09-28 PROBLEM — L08.9 FINGER INFECTION: Status: ACTIVE | Noted: 2023-09-28

## 2023-09-28 RX ORDER — BISOPROLOL FUMARATE 5 MG/1
5 TABLET, FILM COATED ORAL NIGHTLY
COMMUNITY
Start: 2023-05-19

## 2023-09-28 RX ORDER — LINEZOLID 600 MG/1
600 TABLET, FILM COATED ORAL EVERY 12 HOURS
COMMUNITY
Start: 2023-08-21

## 2023-09-28 RX ORDER — OSELTAMIVIR PHOSPHATE 75 MG/1
75 CAPSULE ORAL EVERY 12 HOURS
COMMUNITY
Start: 2023-02-08

## 2023-09-28 RX ORDER — ONDANSETRON 4 MG/1
4 TABLET, FILM COATED ORAL EVERY 6 HOURS PRN
COMMUNITY
Start: 2023-06-02

## 2023-09-28 RX ORDER — ORPHENADRINE CITRATE 100 MG/1
100 TABLET, EXTENDED RELEASE ORAL EVERY 12 HOURS
COMMUNITY
Start: 2023-08-15

## 2023-09-28 RX ORDER — LEVOTHYROXINE SODIUM 50 UG/1
50 TABLET ORAL DAILY
COMMUNITY
Start: 2023-08-06

## 2023-09-28 RX ORDER — CYCLOBENZAPRINE HCL 10 MG
10 TABLET ORAL EVERY 12 HOURS PRN
COMMUNITY
Start: 2023-07-28

## 2023-09-28 RX ORDER — BISOPROLOL FUMARATE 10 MG/1
10 TABLET, FILM COATED ORAL NIGHTLY
COMMUNITY
Start: 2023-07-29

## 2023-09-28 RX ORDER — GABAPENTIN 100 MG/1
100 CAPSULE ORAL EVERY 6 HOURS
COMMUNITY
Start: 2023-08-09

## 2023-09-28 RX ORDER — NORTRIPTYLINE HYDROCHLORIDE 10 MG/1
20 CAPSULE ORAL DAILY
COMMUNITY
Start: 2023-07-23

## 2023-09-28 RX ORDER — NARATRIPTAN 2.5 MG/1
2.5 TABLET ORAL EVERY 4 HOURS PRN
COMMUNITY
Start: 2023-07-28

## 2023-09-28 RX ORDER — FOLIC ACID 1 MG/1
1 TABLET ORAL 2 TIMES DAILY
COMMUNITY
Start: 2023-08-02

## 2023-09-29 VITALS — HEIGHT: 68 IN | WEIGHT: 168.65 LBS | BODY MASS INDEX: 25.56 KG/M2

## 2023-09-30 NOTE — PROGRESS NOTES
Service: Orthopaedics     Subjective Data:   KONSTANTIN CASTILLO is a 62 year old Female who is Hospital Day # 1.    Objective Data:     Objective Information:    ORTHOPEDIC OPERATIVE NOTE    Name: Konstantin Castillo  : 64  Surgeon: Abilio Guerrier DO  Facility: [facility]  Date of Surgery: 23     SURGEON:     Abilio Guerrier DO  ASSISTANT:    SA Jones  PRE OP DIAGNOSIS:  Right long finger infection, flexor tenosynovitis  POST OP DIAGNOSIS:  Same  PROCEDURE:   Right long finger irrigation and debridement   ANESTHESIA:   General  BLOOD LOSS: Minimal     PROCEDURE: The patient was seen and consented preoperatively with the side and site of surgery appropriately marked. The patient was taken back to operative suite, placed supine on the operative table, and placed on monitor for the duration of the case.  The patient was administered sedation and monitored throughout the entire surgery by Department of Anesthesia. While sedated, the right extremity was sterilely prepped and draped in the sterile orthopedic fashion. A time-out was performed confirming the  site of surgery and surgery to be performed.    A 15-blade was used to make an incision over the area of infection, using the old incisions. Dissection was taken down to the extensor mechanism.   The wound was copiously irrigated. The debridement was down to fascia.  The debridement was excisonal,  using a rongeur and pickups with littler's scissors.      A longitudinal incision was made over the A1 pulley of the affected finger, using the old incision. Dissection was taken down to the A1 pulley using Littler's scissors. Blunt dissection was used to identify its margins. The neurovascular bundles were  identified and gently retracted to either side.  The A1 pulley was incised with a scalpel.     A transverse incision made over the A5 pulley at the DIP flexion crease.  Using a tendon passer, angiocath inserted into the tendon sheath and copisouly  irrigated.  A vessel loop passed from A1 to A5 and left as a drain, and from A5 to dorsal wound.      The tourniquet was released. The wound was irrigated. Hemostasis satisfactory.  The skin was closed with 4-0 Nylon suture. Soft bulky dressing was applied. The patient was taken to the recovery room in satisfactory condition.     Electronically signed  Abilio Guerrier DO           Recent Lab Results:    Results:    CBC: 8/22/2023 13:18              \     Hgb     /                              \     11.3 L    /  WBC  ----------------  Plt               6.4       ----------------    247              /     Hct     \                              /     36.4       \            RBC: 4.51     MCV: 81         Assessment and Plan:   Code Status:  ·  Code Status Full Code       Electronic Signatures:  BERRY Guerrier James (DO)  (Signed 22-Aug-2023 15:35)   Authored: Service, Subjective Data, Objective Data, Assessment  and Plan, Note Completion      Last Updated: 22-Aug-2023 15:35 by BERRY Guerrier James ()

## 2023-09-30 NOTE — H&P
History & Physical Reviewed:   I have reviewed the History and Physical dated:  22-Aug-2023   History and Physical reviewed and relevant findings noted. Patient examined to review pertinent physical  findings.: No significant changes   Home Medications Reviewed: no changes noted   Allergies Reviewed: no changes noted       ERAS (Enhanced Recovery After Surgery):  ·  ERAS Patient: no     Consent:   COVID-19 Consent:  ·  COVID-19 Risk Consent Surgeon has reviewed key risks related to the risk of merline COVID-19 and if they contract COVID-19 what the risks are.       Electronic Signatures:  BERRY Guerrier James ()  (Signed 22-Aug-2023 15:29)   Authored: History & Physical Reviewed, ERAS, Consent,  Note Completion      Last Updated: 22-Aug-2023 15:29 by BERRY Guerrier James ()

## 2023-10-02 ENCOUNTER — APPOINTMENT (OUTPATIENT)
Dept: ORTHOPEDIC SURGERY | Facility: CLINIC | Age: 63
End: 2023-10-02
Payer: COMMERCIAL

## (undated) DEVICE — STANDARD HYPODERMIC NEEDLE,ALUMINUM HUB: Brand: MONOJECT

## (undated) DEVICE — DRESSING,GAUZE,PETROLATUM,CURAD,1"X8",ST: Brand: CURAD

## (undated) DEVICE — ZIMMER® STERILE DISPOSABLE TOURNIQUET CUFF WITH PLC, DUAL PORT, SINGLE BLADDER, 18 IN. (46 CM)

## (undated) DEVICE — INTENDED FOR TISSUE SEPARATION, AND OTHER PROCEDURES THAT REQUIRE A SHARP SURGICAL BLADE TO PUNCTURE OR CUT.: Brand: BARD-PARKER ® STAINLESS STEEL BLADES

## (undated) DEVICE — GOWN,SIRUS,FABRNF,XL,20/CS: Brand: MEDLINE

## (undated) DEVICE — DRESSING,GAUZE,XEROFORM,CURAD,1"X8",ST: Brand: CURAD

## (undated) DEVICE — PADDING,UNDERCAST,COTTON, 3X4YD STERILE: Brand: MEDLINE

## (undated) DEVICE — SOLUTION IV IRRIG POUR BRL 0.9% SODIUM CHL 2F7124

## (undated) DEVICE — PACK PROCEDURE SURG GEN CUST

## (undated) DEVICE — CHLORAPREP 26ML ORANGE

## (undated) DEVICE — DRAPE,EXTREMITY,89X128,STERILE: Brand: MEDLINE

## (undated) DEVICE — GAUZE,SPONGE,4"X4",8PLY,STRL,LF,10/TRAY: Brand: MEDLINE

## (undated) DEVICE — SPONGE LAP W18XL18IN WHT COT 4 PLY FLD STRUNG RADPQ DISP ST

## (undated) DEVICE — STOCKINETTE,DOUBLE PLY,6X48,STERILE: Brand: MEDLINE

## (undated) DEVICE — TOWEL,OR,DSP,ST,BLUE,STD,6/PK,12PK/CS: Brand: MEDLINE

## (undated) DEVICE — COVER HNDL LT DISP

## (undated) DEVICE — BANDAGE,GAUZE,CONFORMING,3"X75",STRL,LF: Brand: MEDLINE

## (undated) DEVICE — TUBING SUCT 12FR MAL ALUM SHFT FN CAP VENT UNIV CONN W/ OBT

## (undated) DEVICE — 4-PORT MANIFOLD: Brand: NEPTUNE 2

## (undated) DEVICE — BANDAGE,GAUZE,CONFORMING,4"X75",STRL,LF: Brand: MEDLINE

## (undated) DEVICE — STERILE HOOK LOCK LATEX FREE ELASTIC BANDAGE 4INX5YD: Brand: HOOK LOCK™

## (undated) DEVICE — PADDING UNDERCAST W6INXL4YD WYTEX 6 PER BG

## (undated) DEVICE — SUTURE VCRL SZ 1 L27IN ABSRB VLT L70MM XLH 1/2 CIR J583G

## (undated) DEVICE — BNDG,ELSTC,MATRIX,STRL,3"X5YD,LF,HOOK&LP: Brand: MEDLINE

## (undated) DEVICE — DRAPE CARM MINI FOR IMAG SYS INSIGHT FLROSCN

## (undated) DEVICE — DOUBLE BASIN SET: Brand: MEDLINE INDUSTRIES, INC.

## (undated) DEVICE — ELECTRODE PT RET AD L9FT HI MOIST COND ADH HYDRGEL CORDED